# Patient Record
Sex: FEMALE | Race: WHITE | NOT HISPANIC OR LATINO | Employment: FULL TIME | URBAN - METROPOLITAN AREA
[De-identification: names, ages, dates, MRNs, and addresses within clinical notes are randomized per-mention and may not be internally consistent; named-entity substitution may affect disease eponyms.]

---

## 2018-07-06 ENCOUNTER — RECORDS - HEALTHEAST (OUTPATIENT)
Dept: LAB | Facility: CLINIC | Age: 23
End: 2018-07-06

## 2018-07-09 LAB
QTF INTERPRETATION: NORMAL
QTF MITOGEN - NIL: >10 IU/ML
QTF NIL: 0.12 IU/ML
QTF RESULT: NEGATIVE
QTF TB ANTIGEN - NIL: -0.04 IU/ML

## 2021-05-18 NOTE — PROGRESS NOTES
SUBJECTIVE:   CC: Ayala Vallejo is an 26 year old woman who presents for preventive health visit.       Patient has been advised of split billing requirements and indicates understanding: Yes  Healthy Habits:     Getting at least 3 servings of Calcium per day:  Yes    Bi-annual eye exam:  Yes    Dental care twice a year:  Yes    Sleep apnea or symptoms of sleep apnea:  None    Diet:  Regular (no restrictions)    Frequency of exercise:  2-3 days/week    Duration of exercise:  45-60 minutes    Taking medications regularly:  Yes    Medication side effects:  None    PHQ-2 Total Score: 0    Additional concerns today:  No      Works overnight  Has constipation   Concerned about hemorrhoid  Eats fiber and drinks water  started this work schedule this past October  Has tried miralrx and colace and suppository  Has bloating    Works 3 days in a row nights    PROBLEMS TO ADD ON...    Today's PHQ-2 Score: No flowsheet data found.    Abuse: Current or Past (Physical, Sexual or Emotional) - No  Do you feel safe in your environment? Yes    Have you ever done Advance Care Planning? (For example, a Health Directive, POLST, or a discussion with a medical provider or your loved ones about your wishes): No, advance care planning information given to patient to review.  Patient declined advance care planning discussion at this time.    Social History     Tobacco Use     Smoking status: Not on file   Substance Use Topics     Alcohol use: Not on file     If you drink alcohol do you typically have >3 drinks per day or >7 drinks per week? No    No flowsheet data found.No flowsheet data found.    Reviewed orders with patient.  Reviewed health maintenance and updated orders accordingly - Yes  Labs reviewed in EPIC    Breast Cancer Screening:  Any new diagnosis of family breast, ovarian, or bowel cancer? Yes       FSH-7: No flowsheet data found.    Patient under 40 years of age: Routine Mammogram Screening not recommended.   Pertinent  mammograms are reviewed under the imaging tab.    History of abnormal Pap smear: NO - age 30-65 PAP every 5 years with negative HPV co-testing recommended     Reviewed and updated as needed this visit by clinical staff                 Reviewed and updated as needed this visit by Provider                No past medical history on file.   No past surgical history on file.    Review of Systems  CONSTITUTIONAL: NEGATIVE for fever, chills, change in weight  INTEGUMENTARU/SKIN: NEGATIVE for worrisome rashes, moles or lesions  EYES: NEGATIVE for vision changes or irritation  ENT: NEGATIVE for ear, mouth and throat problems  RESP: NEGATIVE for significant cough or SOB  BREAST: NEGATIVE for masses, tenderness or discharge  CV: NEGATIVE for chest pain, palpitations or peripheral edema  GI: NEGATIVE for nausea, abdominal pain, heartburn, or change in bowel habits  : NEGATIVE for unusual urinary or vaginal symptoms. Periods are regular.  MUSCULOSKELETAL: NEGATIVE for significant arthralgias or myalgia  NEURO: NEGATIVE for weakness, dizziness or paresthesias  PSYCHIATRIC: NEGATIVE for changes in mood or affect     OBJECTIVE:   There were no vitals taken for this visit.  Physical Exam  GENERAL: healthy, alert and no distress  EYES: Eyes grossly normal to inspection, PERRL and conjunctivae and sclerae normal  HENT: ear canals and TM's normal, nose and mouth without ulcers or lesions  NECK: no adenopathy, no asymmetry, masses, or scars and thyroid normal to palpation  RESP: lungs clear to auscultation - no rales, rhonchi or wheezes  BREAST: normal without masses, tenderness or nipple discharge and no palpable axillary masses or adenopathy  CV: regular rate and rhythm, normal S1 S2, no S3 or S4, no murmur, click or rub, no peripheral edema and peripheral pulses strong  ABDOMEN: soft, nontender, no hepatosplenomegaly, no masses and bowel sounds normal   (female): normal female external genitalia, normal urethral meatus, vaginal  mucosa pink, moist, well rugated, and normal cervix/adnexa/uterus without masses or discharge  MS: no gross musculoskeletal defects noted, no edema  SKIN: no suspicious lesions or rashes  NEURO: Normal strength and tone, mentation intact and speech normal  PSYCH: mentation appears normal, affect normal/bright    Diagnostic Test Results:  Labs reviewed in Epic    ASSESSMENT/PLAN:   1. Routine general medical examination at a health care facility    - ESTARYLLA 0.25-35 MG-MCG tablet; Take 1 tablet by mouth daily  Dispense: 90 tablet; Refill: 3  - Pap imaged thin layer screen reflex to HPV if ASCUS - recommend age 25 - 29    Patient has been advised of split billing requirements and indicates understanding: Yes  COUNSELING:  Reviewed preventive health counseling, as reflected in patient instructions       Regular exercise       Healthy diet/nutrition    There is no height or weight on file to calculate BMI.        She has no history on file for tobacco.      Counseling Resources:  ATP IV Guidelines  Pooled Cohorts Equation Calculator  Breast Cancer Risk Calculator  BRCA-Related Cancer Risk Assessment: FHS-7 Tool  FRAX Risk Assessment  ICSI Preventive Guidelines  Dietary Guidelines for Americans, 2010  USDA's MyPlate  ASA Prophylaxis  Lung CA Screening    Josephine Leo MD  [facility]

## 2021-05-20 ENCOUNTER — OFFICE VISIT (OUTPATIENT)
Dept: FAMILY MEDICINE | Facility: CLINIC | Age: 26
End: 2021-05-20
Payer: COMMERCIAL

## 2021-05-20 VITALS
TEMPERATURE: 98.3 F | RESPIRATION RATE: 16 BRPM | HEART RATE: 64 BPM | HEIGHT: 66 IN | DIASTOLIC BLOOD PRESSURE: 73 MMHG | WEIGHT: 150 LBS | OXYGEN SATURATION: 98 % | SYSTOLIC BLOOD PRESSURE: 113 MMHG | BODY MASS INDEX: 24.11 KG/M2

## 2021-05-20 DIAGNOSIS — Z00.00 ROUTINE GENERAL MEDICAL EXAMINATION AT A HEALTH CARE FACILITY: Primary | ICD-10-CM

## 2021-05-20 PROCEDURE — G0145 SCR C/V CYTO,THINLAYER,RESCR: HCPCS | Performed by: FAMILY MEDICINE

## 2021-05-20 PROCEDURE — 99385 PREV VISIT NEW AGE 18-39: CPT | Performed by: FAMILY MEDICINE

## 2021-05-20 RX ORDER — NORGESTIMATE AND ETHINYL ESTRADIOL 0.25-0.035
1 KIT ORAL DAILY
Qty: 90 TABLET | Refills: 3 | Status: SHIPPED | OUTPATIENT
Start: 2021-05-20 | End: 2022-10-06

## 2021-05-20 RX ORDER — NORGESTIMATE AND ETHINYL ESTRADIOL 0.25-0.035
1 KIT ORAL DAILY
COMMUNITY
Start: 2021-04-01 | End: 2021-05-20

## 2021-05-20 SDOH — HEALTH STABILITY: MENTAL HEALTH: HOW MANY STANDARD DRINKS CONTAINING ALCOHOL DO YOU HAVE ON A TYPICAL DAY?: NOT ASKED

## 2021-05-20 SDOH — HEALTH STABILITY: MENTAL HEALTH: HOW OFTEN DO YOU HAVE A DRINK CONTAINING ALCOHOL?: NOT ASKED

## 2021-05-20 SDOH — HEALTH STABILITY: MENTAL HEALTH: HOW OFTEN DO YOU HAVE 6 OR MORE DRINKS ON ONE OCCASION?: NOT ASKED

## 2021-05-20 ASSESSMENT — MIFFLIN-ST. JEOR: SCORE: 1434.21

## 2021-05-25 LAB
COPATH REPORT: NORMAL
PAP: NORMAL

## 2021-06-21 ENCOUNTER — TRANSFERRED RECORDS (OUTPATIENT)
Dept: HEALTH INFORMATION MANAGEMENT | Facility: CLINIC | Age: 26
End: 2021-06-21

## 2021-11-18 ENCOUNTER — TRANSFERRED RECORDS (OUTPATIENT)
Dept: HEALTH INFORMATION MANAGEMENT | Facility: CLINIC | Age: 26
End: 2021-11-18
Payer: COMMERCIAL

## 2021-11-29 ENCOUNTER — TRANSFERRED RECORDS (OUTPATIENT)
Dept: HEALTH INFORMATION MANAGEMENT | Facility: CLINIC | Age: 26
End: 2021-11-29
Payer: COMMERCIAL

## 2022-01-17 ENCOUNTER — TRANSFERRED RECORDS (OUTPATIENT)
Dept: HEALTH INFORMATION MANAGEMENT | Facility: CLINIC | Age: 27
End: 2022-01-17
Payer: COMMERCIAL

## 2022-03-02 ENCOUNTER — TRANSFERRED RECORDS (OUTPATIENT)
Dept: HEALTH INFORMATION MANAGEMENT | Facility: CLINIC | Age: 27
End: 2022-03-02
Payer: COMMERCIAL

## 2022-04-04 ENCOUNTER — TRANSFERRED RECORDS (OUTPATIENT)
Dept: HEALTH INFORMATION MANAGEMENT | Facility: CLINIC | Age: 27
End: 2022-04-04
Payer: COMMERCIAL

## 2022-07-17 ENCOUNTER — HEALTH MAINTENANCE LETTER (OUTPATIENT)
Age: 27
End: 2022-07-17

## 2022-09-25 ENCOUNTER — HEALTH MAINTENANCE LETTER (OUTPATIENT)
Age: 27
End: 2022-09-25

## 2022-10-03 ASSESSMENT — ENCOUNTER SYMPTOMS
WEAKNESS: 0
NAUSEA: 0
PALPITATIONS: 0
EYE PAIN: 0
DIARRHEA: 0
HEMATOCHEZIA: 0
PARESTHESIAS: 0
HEMATURIA: 0
DIZZINESS: 0
FEVER: 0
FREQUENCY: 0
SORE THROAT: 0
MYALGIAS: 0
SHORTNESS OF BREATH: 0
BREAST MASS: 0
CHILLS: 0
ARTHRALGIAS: 0
HEADACHES: 0
DYSURIA: 0
HEARTBURN: 0
COUGH: 0
ABDOMINAL PAIN: 0
JOINT SWELLING: 0
NERVOUS/ANXIOUS: 0
CONSTIPATION: 0

## 2022-10-06 ENCOUNTER — OFFICE VISIT (OUTPATIENT)
Dept: FAMILY MEDICINE | Facility: CLINIC | Age: 27
End: 2022-10-06
Payer: COMMERCIAL

## 2022-10-06 VITALS
DIASTOLIC BLOOD PRESSURE: 70 MMHG | TEMPERATURE: 97.5 F | BODY MASS INDEX: 24.48 KG/M2 | OXYGEN SATURATION: 98 % | SYSTOLIC BLOOD PRESSURE: 109 MMHG | HEART RATE: 63 BPM | HEIGHT: 66 IN | WEIGHT: 152.3 LBS | RESPIRATION RATE: 16 BRPM

## 2022-10-06 DIAGNOSIS — Z00.00 ROUTINE GENERAL MEDICAL EXAMINATION AT A HEALTH CARE FACILITY: Primary | ICD-10-CM

## 2022-10-06 DIAGNOSIS — N92.6 IRREGULAR PERIODS: ICD-10-CM

## 2022-10-06 LAB — HBA1C MFR BLD: 4.8 % (ref 0–5.6)

## 2022-10-06 PROCEDURE — 83036 HEMOGLOBIN GLYCOSYLATED A1C: CPT | Performed by: FAMILY MEDICINE

## 2022-10-06 PROCEDURE — 83001 ASSAY OF GONADOTROPIN (FSH): CPT | Performed by: FAMILY MEDICINE

## 2022-10-06 PROCEDURE — 84443 ASSAY THYROID STIM HORMONE: CPT | Performed by: FAMILY MEDICINE

## 2022-10-06 PROCEDURE — 84403 ASSAY OF TOTAL TESTOSTERONE: CPT | Performed by: FAMILY MEDICINE

## 2022-10-06 PROCEDURE — 83002 ASSAY OF GONADOTROPIN (LH): CPT | Performed by: FAMILY MEDICINE

## 2022-10-06 PROCEDURE — 82670 ASSAY OF TOTAL ESTRADIOL: CPT | Performed by: FAMILY MEDICINE

## 2022-10-06 PROCEDURE — 99395 PREV VISIT EST AGE 18-39: CPT | Performed by: FAMILY MEDICINE

## 2022-10-06 PROCEDURE — 36415 COLL VENOUS BLD VENIPUNCTURE: CPT | Performed by: FAMILY MEDICINE

## 2022-10-06 PROCEDURE — 82627 DEHYDROEPIANDROSTERONE: CPT | Performed by: FAMILY MEDICINE

## 2022-10-06 ASSESSMENT — ENCOUNTER SYMPTOMS
EYE PAIN: 0
MYALGIAS: 0
CONSTIPATION: 0
PALPITATIONS: 0
DIARRHEA: 0
DYSURIA: 0
FEVER: 0
FREQUENCY: 0
CHILLS: 0
NAUSEA: 0
JOINT SWELLING: 0
HEADACHES: 0
WEAKNESS: 0
HEMATURIA: 0
ARTHRALGIAS: 0
SHORTNESS OF BREATH: 0
COUGH: 0
BREAST MASS: 0
DIZZINESS: 0
HEARTBURN: 0
HEMATOCHEZIA: 0
PARESTHESIAS: 0
SORE THROAT: 0
ABDOMINAL PAIN: 0
NERVOUS/ANXIOUS: 0

## 2022-10-06 ASSESSMENT — PAIN SCALES - GENERAL: PAINLEVEL: NO PAIN (0)

## 2022-10-06 NOTE — PROGRESS NOTES
SUBJECTIVE:   CC: Ayala is an 27 year old who presents for preventive health visit.     Patient has been advised of split billing requirements and indicates understanding: Yes  Healthy Habits:     Getting at least 3 servings of Calcium per day:  Yes    Bi-annual eye exam:  Yes    Dental care twice a year:  Yes    Sleep apnea or symptoms of sleep apnea:  None    Diet:  Regular (no restrictions)    Frequency of exercise:  2-3 days/week    Duration of exercise:  30-45 minutes    Taking medications regularly:  Not Applicable    PHQ-2 Total Score: 0    Additional concerns today:  No    (Z00.00) Routine general medical examination at a health care facility  (primary encounter diagnosis)  Comment:   Plan:     (N92.6) Irregular periods  Comment: Reports that she is always had some irregular periods has not had hormonal evaluation for this stopped birth control   Plan: Hemoglobin A1c, TSH with free T4 reflex,         Luteinizing Hormone, Adult, Follicle         stimulating hormone, Estradiol, DHEA sulfate,         Testosterone, total                      Today's PHQ-2 Score:   PHQ-2 ( 1999 [employer]) 10/3/2022   Q1: Little interest or pleasure in doing things 0   Q2: Feeling down, depressed or hopeless 0   PHQ-2 Score 0   PHQ-2 Total Score (12-17 Years)- Positive if 3 or more points; Administer PHQ-A if positive -   Q1: Little interest or pleasure in doing things Not at all   Q2: Feeling down, depressed or hopeless Not at all   PHQ-2 Score 0       Abuse: Current or Past (Physical, Sexual or Emotional) - No  Do you feel safe in your environment? Yes        Social History     Tobacco Use     Smoking status: Never Smoker     Smokeless tobacco: Never Used   Substance Use Topics     Alcohol use: Yes     If you drink alcohol do you typically have >3 drinks per day or >7 drinks per week? No    No flowsheet data found.    Reviewed orders with patient.  Reviewed health maintenance and updated orders accordingly - Yes  Lab work is in  process    Breast Cancer Screening:    FHS-7:   Breast CA Risk Assessment (FHS-7) 10/6/2022   Did any of your first-degree relatives have breast or ovarian cancer? No   Did any of your relatives have bilateral breast cancer? No   Did any man in your family have breast cancer? No   Did any woman in your family have breast and ovarian cancer? No   Did any woman in your family have breast cancer before age 50 y? No   Do you have 2 or more relatives with breast and/or ovarian cancer? No   Do you have 2 or more relatives with breast and/or bowel cancer? Yes       Patient under 40 years of age: Routine Mammogram Screening not recommended.   Pertinent mammograms are reviewed under the imaging tab.    History of abnormal Pap smear: NO - age 30- 65 PAP every 3 years recommended  PAP / HPV 5/20/2021   PAP (Historical) NIL     Reviewed and updated as needed this visit by clinical staff   Tobacco  Allergies  Meds   Med Hx  Surg Hx  Fam Hx  Soc Hx          Reviewed and updated as needed this visit by Provider                       Review of Systems   Constitutional: Negative for chills and fever.   HENT: Negative for congestion, ear pain, hearing loss and sore throat.    Eyes: Negative for pain and visual disturbance.   Respiratory: Negative for cough and shortness of breath.    Cardiovascular: Negative for chest pain, palpitations and peripheral edema.   Gastrointestinal: Negative for abdominal pain, constipation, diarrhea, heartburn, hematochezia and nausea.   Breasts:  Negative for tenderness, breast mass and discharge.   Genitourinary: Negative for dysuria, frequency, genital sores, hematuria, pelvic pain, urgency, vaginal bleeding and vaginal discharge.   Musculoskeletal: Negative for arthralgias, joint swelling and myalgias.   Skin: Negative for rash.   Neurological: Negative for dizziness, weakness, headaches and paresthesias.   Psychiatric/Behavioral: Negative for mood changes. The patient is not nervous/anxious.   "         OBJECTIVE:   /70   Pulse 63   Temp 97.5  F (36.4  C) (Temporal)   Resp 16   Ht 1.665 m (5' 5.55\")   Wt 69.1 kg (152 lb 4.8 oz)   LMP 09/28/2022 (Approximate)   SpO2 98%   BMI 24.92 kg/m    Physical Exam  GENERAL: healthy, alert and no distress  EYES: Eyes grossly normal to inspection, PERRL and conjunctivae and sclerae normal  HENT: ear canals and TM's normal, nose and mouth without ulcers or lesions  NECK: no adenopathy, no asymmetry, masses, or scars and thyroid normal to palpation  RESP: lungs clear to auscultation - no rales, rhonchi or wheezes  BREAST: normal without masses, tenderness or nipple discharge and no palpable axillary masses or adenopathy  CV: regular rate and rhythm, normal S1 S2, no S3 or S4, no murmur, click or rub, no peripheral edema and peripheral pulses strong  ABDOMEN: soft, nontender, no hepatosplenomegaly, no masses and bowel sounds normal  MS: no gross musculoskeletal defects noted, no edema  SKIN: no suspicious lesions or rashes  NEURO: Normal strength and tone, mentation intact and speech normal  PSYCH: mentation appears normal, affect normal/bright    Diagnostic Test Results:  Labs reviewed in Epic    ASSESSMENT/PLAN:   (Z00.00) Routine general medical examination at a health care facility  (primary encounter diagnosis)  Comment:    Plan:      (N92.6) Irregular periods  Comment:    Plan: Hemoglobin A1c, TSH with free T4 reflex,         Luteinizing Hormone, Adult, Follicle         stimulating hormone, Estradiol, DHEA sulfate,         Testosterone, total               Patient has been advised of split billing requirements and indicates understanding: Yes    COUNSELING:  Reviewed preventive health counseling, as reflected in patient instructions       Regular exercise       Healthy diet/nutrition       Contraception    Estimated body mass index is 24.92 kg/m  as calculated from the following:    Height as of this encounter: 1.665 m (5' 5.55\").    Weight as of this " encounter: 69.1 kg (152 lb 4.8 oz).        She reports that she has never smoked. She has never used smokeless tobacco.      Counseling Resources:  ATP IV Guidelines  Pooled Cohorts Equation Calculator  Breast Cancer Risk Calculator  BRCA-Related Cancer Risk Assessment: FHS-7 Tool  FRAX Risk Assessment  ICSI Preventive Guidelines  Dietary Guidelines for Americans, 2010  USDA's MyPlate  ASA Prophylaxis  Lung CA Screening    Josephine Leo MD  [facility]

## 2022-10-07 LAB
DHEA-S SERPL-MCNC: 103 UG/DL (ref 35–430)
ESTRADIOL SERPL-MCNC: 47 PG/ML
FSH SERPL IRP2-ACNC: 7.3 MIU/ML
LH SERPL-ACNC: 10.3 MIU/ML
TSH SERPL DL<=0.005 MIU/L-ACNC: 2.84 MU/L (ref 0.4–4)

## 2022-10-08 LAB — TESTOST SERPL-MCNC: 44 NG/DL (ref 8–60)

## 2022-10-18 NOTE — RESULT ENCOUNTER NOTE
Hello,    Great news, your results were normal.  Normal hormone levels and no signs of diabetes  If you continue to have irregular periods we can manage these usually with birth control of some form, but currently it does not look like you have signs of PCOS which is good.    Josephine Leo MD

## 2023-04-06 ENCOUNTER — OFFICE VISIT (OUTPATIENT)
Dept: OBGYN | Facility: CLINIC | Age: 28
End: 2023-04-06
Payer: COMMERCIAL

## 2023-04-06 VITALS
DIASTOLIC BLOOD PRESSURE: 62 MMHG | SYSTOLIC BLOOD PRESSURE: 110 MMHG | HEIGHT: 66 IN | BODY MASS INDEX: 25.55 KG/M2 | WEIGHT: 159 LBS

## 2023-04-06 DIAGNOSIS — Z13.228 SCREENING FOR METABOLIC DISORDER: ICD-10-CM

## 2023-04-06 DIAGNOSIS — Z13.29 SCREENING FOR THYROID DISORDER: ICD-10-CM

## 2023-04-06 DIAGNOSIS — Z01.419 ENCOUNTER FOR GYNECOLOGICAL EXAMINATION WITHOUT ABNORMAL FINDING: Primary | ICD-10-CM

## 2023-04-06 LAB
CORTIS SERPL-MCNC: 10.1 UG/DL
TSH SERPL DL<=0.005 MIU/L-ACNC: 1.12 UIU/ML (ref 0.3–4.2)

## 2023-04-06 PROCEDURE — 99385 PREV VISIT NEW AGE 18-39: CPT | Performed by: NURSE PRACTITIONER

## 2023-04-06 PROCEDURE — 84443 ASSAY THYROID STIM HORMONE: CPT | Performed by: NURSE PRACTITIONER

## 2023-04-06 PROCEDURE — 82533 TOTAL CORTISOL: CPT | Performed by: NURSE PRACTITIONER

## 2023-04-06 PROCEDURE — 36415 COLL VENOUS BLD VENIPUNCTURE: CPT | Performed by: NURSE PRACTITIONER

## 2023-04-06 ASSESSMENT — ANXIETY QUESTIONNAIRES
2. NOT BEING ABLE TO STOP OR CONTROL WORRYING: NOT AT ALL
GAD7 TOTAL SCORE: 0
6. BECOMING EASILY ANNOYED OR IRRITABLE: NOT AT ALL
7. FEELING AFRAID AS IF SOMETHING AWFUL MIGHT HAPPEN: NOT AT ALL
1. FEELING NERVOUS, ANXIOUS, OR ON EDGE: NOT AT ALL
GAD7 TOTAL SCORE: 0
3. WORRYING TOO MUCH ABOUT DIFFERENT THINGS: NOT AT ALL
5. BEING SO RESTLESS THAT IT IS HARD TO SIT STILL: NOT AT ALL

## 2023-04-06 ASSESSMENT — PATIENT HEALTH QUESTIONNAIRE - PHQ9
SUM OF ALL RESPONSES TO PHQ QUESTIONS 1-9: 0
5. POOR APPETITE OR OVEREATING: NOT AT ALL

## 2023-04-06 NOTE — PROGRESS NOTES
Radha is a 28 year old No obstetric history on file. female who presents for annual exam.     Besides routine health maintenance, she would like to discuss irregular cycles.    HPI:  The patient's PCP is Dr. Josephine Leo MD.  Ayala presents for an annual exam. She went off of COCs in March of last year and did not get a period until a few months ago. Since that time, she has had two cycles, approx 1.5 months apart. Labs were completed in October and were normal. Additionally, she has noticed a 20 lb weight gain over the last year. She has not changed her diet or exercise habits at all. She does notice that she feels cold frequently, but no other associated symptoms. She is sexually active and not currently using contraception. She and her  are open to pregnancy at this time, though not actively trying. She denies abnormal discharge or any other symptoms today. She works as a nurse at [employer].      GYNECOLOGIC HISTORY:    Patient's last menstrual period was 2023 (approximate).    Regular menses? no  Menses every 6 months.  Length of menses: 4 days    Her current contraception method is: none.  She  reports that she has never smoked. She has never used smokeless tobacco.    Patient is sexually active.  STD testing offered?  Declined    Last GAD7 score on record =       2023     1:12 PM   JANET-7 SCORE   Total Score 0     Alcohol Score = 3    HEALTH MAINTENANCE:  Cholesterol: No results  Last Mammo: Not applicable, Next Mammo: Due at age 41   Pap:   Lab Results   Component Value Date    PAP NIL 2021      Colonoscopy: N/A, Next Colonoscopy: Due at age 46.  Dexa:  N/A    Health maintenance updated:  yes    HISTORY:  OB History    Para Term  AB Living   0 0 0 0 0 0   SAB IAB Ectopic Multiple Live Births   0 0 0 0 0       There is no problem list on file for this patient.    Past Surgical History:   Procedure Laterality Date     ORTHOPEDIC SURGERY  2021     "  Social History     Tobacco Use     Smoking status: Never     Smokeless tobacco: Never   Vaping Use     Vaping status: Not on file   Substance Use Topics     Alcohol use: Yes      Problem (# of Occurrences) Relation (Name,Age of Onset)    Hypertension (1) Mother (Rosa Vallejo)       Negative family history of: Breast Cancer, Colon Cancer            No current outpatient medications on file.     No current facility-administered medications for this visit.     No Known Allergies    Past medical, surgical, social and family histories were reviewed and updated in EPIC.    ROS:   12 point review of systems negative other than symptoms noted below or in the HPI.  Genitourinary: Irregular Menses  No urinary frequency or dysuria, bladder or kidney problems    EXAM:  /62   Ht 1.664 m (5' 5.5\")   Wt 72.1 kg (159 lb)   LMP 03/16/2023 (Approximate)   BMI 26.06 kg/m     BMI: Body mass index is 26.06 kg/m .    PHYSICAL EXAM:  Constitutional:   Appearance: Well nourished, well developed, alert, in no acute distress  Neck:  Lymph Nodes:  No lymphadenopathy present    Thyroid:  Gland size normal, nontender, no nodules or masses present  on palpation  Chest:  Respiratory Effort:  Breathing unlabored  Cardiovascular:    Heart: Auscultation:  Regular rate, normal rhythm, no murmurs present  Breasts: Inspection of Breasts:  No lymphadenopathy present., Palpation of Breasts and Axillae:  No masses present on palpation, no breast tenderness., Axillary Lymph Nodes:  No lymphadenopathy present. and No nodularity, asymmetry or nipple discharge bilaterally.  Gastrointestinal:   Abdominal Examination:  Abdomen nontender to palpation, tone normal without rigidity or guarding, no masses present, umbilicus without lesions   Liver and Spleen:  No hepatomegaly present, liver nontender to palpation    Hernias:  No hernias present  Lymphatic: Lymph Nodes:  No other lymphadenopathy present  Skin:  General Inspection:  No rashes present, no " lesions present, no areas of  discoloration  Neurologic:    Mental Status:  Oriented X3.  Normal strength and tone, sensory exam                grossly normal, mentation intact and speech normal.    Psychiatric:   Mentation appears normal and affect normal/bright.         Pelvic Exam:  External Genitalia:     Normal appearance for age, no discharge present, no tenderness present, no inflammatory lesions present, color normal  Vagina:     Normal vaginal vault without central or paravaginal defects, no discharge present, no inflammatory lesions present, no masses present  Bladder:     Nontender to palpation  Urethra:   Urethral Body:  Urethra palpation normal, urethra structural support normal   Urethral Meatus:  No erythema or lesions present  Cervix:     Appearance healthy, no lesions present, nontender to palpation, no bleeding present  Uterus:     Uterus: firm, normal sized and nontender, anteverted in position.   Adnexa:     No adnexal tenderness present, no adnexal masses present  Perineum:     Perineum within normal limits, no evidence of trauma, no rashes or skin lesions present  Anus:     Anus within normal limits, no hemorrhoids present  Inguinal Lymph Nodes:     No lymphadenopathy present  Pubic Hair:     Normal pubic hair distribution for age  Genitalia and Groin:     No rashes present, no lesions present, no areas of discoloration, no masses present      COUNSELING:   Reviewed preventive health counseling, as reflected in patient instructions    BMI: Body mass index is 26.06 kg/m .  Weight management plan: Discussed healthy diet and exercise guidelines    ASSESSMENT:  28 year old female with satisfactory annual exam.    ICD-10-CM    1. Encounter for gynecological examination without abnormal finding  Z01.419       2. Screening for thyroid disorder  Z13.29 TSH with free T4 reflex     TSH with free T4 reflex      3. Screening for metabolic disorder  Z13.228 Cortisol     Cortisol          PLAN:  TSH and  Cortisol for assessment of weight gain.  Discussed that her recent cycles are reassuring of return to more regular menstrual cycles. Advised that she continue to monitor the next few cycles; if they normalize, she does not need any intervention. If she notices more variability in cycle length, she may reach out for evaluation.  Follow up as needed or in one year for annual exam.    I, Desire Tan, completed the PFSH and ROS. I then acted as a scribe for Kayla PEREZ CNP for the remainder of the visit.  Desire Tan BSN, RN  [facility] DNP, WHNP student    I was present with the student who participated in the service and in the documentation of the note. I have verified the history and personally performed the physical exam and medical decision-making. I agree with the assessment and plan of care as documented in the note.      ANA Kitchen CNP

## 2024-05-02 ENCOUNTER — TRANSFERRED RECORDS (OUTPATIENT)
Dept: HEALTH INFORMATION MANAGEMENT | Facility: CLINIC | Age: 29
End: 2024-05-02
Payer: COMMERCIAL

## 2024-05-08 NOTE — RESULT ENCOUNTER NOTE
Hello,    Colonoscopy showed normal colon but did show some internal hemorrhoids.  Let me know if you need follow-up on this or if you already have arranged for this.    Josephine Leo MD

## 2024-05-11 ENCOUNTER — HEALTH MAINTENANCE LETTER (OUTPATIENT)
Age: 29
End: 2024-05-11

## 2024-06-03 ENCOUNTER — VIRTUAL VISIT (OUTPATIENT)
Dept: FAMILY MEDICINE | Facility: CLINIC | Age: 29
End: 2024-06-03
Payer: COMMERCIAL

## 2024-06-03 DIAGNOSIS — L50.9 HIVES: Primary | ICD-10-CM

## 2024-06-03 DIAGNOSIS — K59.00 CONSTIPATION, UNSPECIFIED CONSTIPATION TYPE: ICD-10-CM

## 2024-06-03 PROCEDURE — 99214 OFFICE O/P EST MOD 30 MIN: CPT | Mod: 95 | Performed by: FAMILY MEDICINE

## 2024-06-03 RX ORDER — EPINEPHRINE 0.3 MG/.3ML
0.3 INJECTION SUBCUTANEOUS PRN
Qty: 2 EACH | Refills: 4 | Status: SHIPPED | OUTPATIENT
Start: 2024-06-03

## 2024-06-03 RX ORDER — ONDANSETRON 4 MG/1
4 TABLET, ORALLY DISINTEGRATING ORAL EVERY 8 HOURS PRN
Qty: 40 TABLET | Refills: 2 | Status: SHIPPED | OUTPATIENT
Start: 2024-06-03

## 2024-06-03 NOTE — PROGRESS NOTES
Ayala is a 29 year old who is being evaluated via a billable video visit.          Assessment & Plan     (L50.9) Hives  (primary encounter diagnosis)  Comment: offered allergy testing - not getting in to allergist until late summer  Meds for rescue too  Reveiwed sometimes hives can indicated bacterial infection - advised to watch for any infections  Plan: EPINEPHrine (ANY BX GENERIC EQUIV) 0.3 MG/0.3ML        injection 2-pack, Semmes Resp Allergen Panel,         Allergy adult food panel, UA with Microscopic         reflex to Culture - lab collect, CBC with         platelets and differential, ESR: Erythrocyte         sedimentation rate, CRP, inflammation, Anti         Nuclear Zo IgG by IFA with Reflex, DNA double         stranded antibodies, TSH with free T4 reflex,         ondansetron (ZOFRAN ODT) 4 MG ODT tab            (K59.00) Constipation, unspecified constipation type  Comment: will try to send in linzess  Plan: TSH with free T4 reflex, ondansetron (ZOFRAN         ODT) 4 MG ODT tab, DISCONTINUED: linaclotide         (LINZESS) 290 MCG capsule                        Subjective   Ayala is a 29 year old, presenting for the following health issues:  No chief complaint on file.    HPI   Allergies:  Getting bad seasonal allergies  Has tried benadryl and zyrtec hydroxyzine  Don't seem to work fast enough  Has been using both meds together  Has not tried nasal spray and eye drops    Has more skin reaction  Lips get itchy throat feels narrow  Has allergy appointment  In August  Plans to go to allergist in [place]  Has been tracking her diet  The last 6 times this happened ahs been a list of what she has eaten no correlations noted yet  Has noted more of these events when at work  Can happen at home too    Is not on any meds    Has head to toe swelling    Started taking notes back in January  Has had hives  Back arms and legs  Face    Stools:  Difficulty with constipation  Has tried dietary changes water fiber miralax  Had  a trial of linzess - this worked well for her  concern this is not covered by insurance now              Review of Systems  Constitutional, HEENT, cardiovascular, pulmonary, gi and gu systems are negative, except as otherwise noted.      Objective           Vitals:  No vitals were obtained today due to virtual visit.    Physical Exam   GENERAL: alert and no distress  EYES: Eyes grossly normal to inspection.  No discharge or erythema, or obvious scleral/conjunctival abnormalities.  RESP: No audible wheeze, cough, or visible cyanosis.    SKIN: Visible skin clear. No significant rash, abnormal pigmentation or lesions.  NEURO: Cranial nerves grossly intact.  Mentation and speech appropriate for age.  PSYCH: Appropriate affect, tone, and pace of words          Video-Visit Details  Joined the call at 6/3/2024, 5:23:33 pm.  Left the call at 6/3/2024, 5:36:34 pm.  You were on the call for 13 minutes .  Type of service:  Video Visit   Originating Location (pt. Location): Home    Distant Location (provider location):  On-site  Platform used for Video Visit: Martín  Signed Electronically by: Josephine Leo MD

## 2024-06-05 ENCOUNTER — TELEPHONE (OUTPATIENT)
Dept: FAMILY MEDICINE | Facility: CLINIC | Age: 29
End: 2024-06-05
Payer: COMMERCIAL

## 2024-06-05 DIAGNOSIS — K59.00 CONSTIPATION, UNSPECIFIED CONSTIPATION TYPE: Primary | ICD-10-CM

## 2024-06-05 NOTE — TELEPHONE ENCOUNTER
PRIOR AUTHORIZATION DENIED    Medication: LINACLOTIDE 290 MCG PO CAPS  Insurance Company: [employer] [place] - Phone [phone] Fax [phone]  Denial Date: 6/5/2024  Denial Reason(s):       Appeal Information:     Patient Notified: No

## 2024-06-07 NOTE — TELEPHONE ENCOUNTER
SN,    Please see below: medication was denied for patient.    If you would like to start appeal, please write Letter of Medical Necessity ID number [id number] under the letter tab and send back to [facility] MED POOL (P [id number])    Thank you,    Jordyn Wolf RN

## 2024-06-10 ENCOUNTER — MYC MEDICAL ADVICE (OUTPATIENT)
Dept: FAMILY MEDICINE | Facility: CLINIC | Age: 29
End: 2024-06-10
Payer: COMMERCIAL

## 2024-06-10 DIAGNOSIS — K59.00 CONSTIPATION, UNSPECIFIED CONSTIPATION TYPE: ICD-10-CM

## 2024-06-10 NOTE — TELEPHONE ENCOUNTER
Please let pt know that insurance denied this med but will cover trulance/plecanide.  If she has already tried this med then please ask what her side effects were or if the medications did not work and then we can let them know this nd run the PA.  Ifgary has not tried this then would need to try it for 4 weeks    I sent this out to her pharmacy    Thank you    SN

## 2024-06-10 NOTE — TELEPHONE ENCOUNTER
JOOR message sent to patient.    Your insurance denied prior authorization for Linaclotide capsules but will cover trulance/plecaide.        Have you tried trulance/plecaide before?       Jordyn Wolf RN

## 2024-06-11 NOTE — TELEPHONE ENCOUNTER
SN,      Patient sent 2nd [employer] message:    Ayala Justice  You3 minutes ago (1:31 PM)     KG  Oops I m sorry, no I have not tried it. I got it confused with another medication       Jordyn Wolf RN

## 2024-06-11 NOTE — TELEPHONE ENCOUNTER
SN,      See message below from [employer] message sent to patient    Patient response:     Hi yes I have and it was not effective and I have adverse side effects       I have been taking linzess for a month now with no issues     Thanks,  Jordyn Wolf RN

## 2024-06-13 NOTE — TELEPHONE ENCOUNTER
Please let pt know that her insurance would like her to try Trulance/Plecanatide and if this is not working then we can re-try the Linzess    Thank you    SN

## 2024-06-14 ENCOUNTER — LAB (OUTPATIENT)
Dept: LAB | Facility: CLINIC | Age: 29
End: 2024-06-14
Payer: COMMERCIAL

## 2024-06-14 DIAGNOSIS — K59.00 CONSTIPATION, UNSPECIFIED CONSTIPATION TYPE: ICD-10-CM

## 2024-06-14 DIAGNOSIS — Z11.4 SCREENING FOR HIV (HUMAN IMMUNODEFICIENCY VIRUS): Primary | ICD-10-CM

## 2024-06-14 DIAGNOSIS — L50.9 HIVES: ICD-10-CM

## 2024-06-14 DIAGNOSIS — Z11.59 NEED FOR HEPATITIS C SCREENING TEST: ICD-10-CM

## 2024-06-14 LAB
ALBUMIN UR-MCNC: NEGATIVE MG/DL
APPEARANCE UR: CLEAR
BACTERIA #/AREA URNS HPF: ABNORMAL /HPF
BASOPHILS # BLD AUTO: 0 10E3/UL (ref 0–0.2)
BASOPHILS NFR BLD AUTO: 0 %
BILIRUB UR QL STRIP: NEGATIVE
COLOR UR AUTO: YELLOW
EOSINOPHIL # BLD AUTO: 0.1 10E3/UL (ref 0–0.7)
EOSINOPHIL NFR BLD AUTO: 2 %
ERYTHROCYTE [DISTWIDTH] IN BLOOD BY AUTOMATED COUNT: 11.2 % (ref 10–15)
ERYTHROCYTE [SEDIMENTATION RATE] IN BLOOD BY WESTERGREN METHOD: 6 MM/HR (ref 0–20)
GLUCOSE UR STRIP-MCNC: NEGATIVE MG/DL
HCT VFR BLD AUTO: 35.3 % (ref 35–47)
HGB BLD-MCNC: 12 G/DL (ref 11.7–15.7)
HGB UR QL STRIP: ABNORMAL
IMM GRANULOCYTES # BLD: 0 10E3/UL
IMM GRANULOCYTES NFR BLD: 0 %
KETONES UR STRIP-MCNC: NEGATIVE MG/DL
LEUKOCYTE ESTERASE UR QL STRIP: NEGATIVE
LYMPHOCYTES # BLD AUTO: 2.4 10E3/UL (ref 0.8–5.3)
LYMPHOCYTES NFR BLD AUTO: 35 %
MCH RBC QN AUTO: 31.1 PG (ref 26.5–33)
MCHC RBC AUTO-ENTMCNC: 34 G/DL (ref 31.5–36.5)
MCV RBC AUTO: 92 FL (ref 78–100)
MONOCYTES # BLD AUTO: 0.3 10E3/UL (ref 0–1.3)
MONOCYTES NFR BLD AUTO: 4 %
NEUTROPHILS # BLD AUTO: 3.9 10E3/UL (ref 1.6–8.3)
NEUTROPHILS NFR BLD AUTO: 58 %
NITRATE UR QL: NEGATIVE
PH UR STRIP: 7 [PH] (ref 5–7)
PLATELET # BLD AUTO: 128 10E3/UL (ref 150–450)
RBC # BLD AUTO: 3.86 10E6/UL (ref 3.8–5.2)
RBC #/AREA URNS AUTO: ABNORMAL /HPF
SP GR UR STRIP: 1.01 (ref 1–1.03)
SQUAMOUS #/AREA URNS AUTO: ABNORMAL /LPF
UROBILINOGEN UR STRIP-ACNC: 0.2 E.U./DL
WBC # BLD AUTO: 6.8 10E3/UL (ref 4–11)
WBC #/AREA URNS AUTO: ABNORMAL /HPF

## 2024-06-14 PROCEDURE — 86225 DNA ANTIBODY NATIVE: CPT

## 2024-06-14 PROCEDURE — 81001 URINALYSIS AUTO W/SCOPE: CPT

## 2024-06-14 PROCEDURE — 86140 C-REACTIVE PROTEIN: CPT

## 2024-06-14 PROCEDURE — 36415 COLL VENOUS BLD VENIPUNCTURE: CPT

## 2024-06-14 PROCEDURE — 86003 ALLG SPEC IGE CRUDE XTRC EA: CPT | Mod: 59

## 2024-06-14 PROCEDURE — 84443 ASSAY THYROID STIM HORMONE: CPT

## 2024-06-14 PROCEDURE — 85652 RBC SED RATE AUTOMATED: CPT

## 2024-06-14 PROCEDURE — 86038 ANTINUCLEAR ANTIBODIES: CPT

## 2024-06-14 PROCEDURE — 85025 COMPLETE CBC W/AUTO DIFF WBC: CPT

## 2024-06-14 PROCEDURE — 82785 ASSAY OF IGE: CPT

## 2024-06-14 PROCEDURE — 86803 HEPATITIS C AB TEST: CPT

## 2024-06-14 PROCEDURE — 87389 HIV-1 AG W/HIV-1&-2 AB AG IA: CPT

## 2024-06-15 DIAGNOSIS — D69.6 LOW PLATELET COUNT (H): Primary | ICD-10-CM

## 2024-06-15 LAB
CRP SERPL-MCNC: <3 MG/L
DSDNA AB SER-ACNC: 1.2 IU/ML
HCV AB SERPL QL IA: NONREACTIVE
HIV 1+2 AB+HIV1 P24 AG SERPL QL IA: NONREACTIVE
TSH SERPL DL<=0.005 MIU/L-ACNC: 1.05 UIU/ML (ref 0.3–4.2)

## 2024-06-15 NOTE — RESULT ENCOUNTER NOTE
Hello,    No UTI noted.  There was a trace amount of blood in the urine and this is non-specific.  Can be due to wiping with cleansing towel or periods.  We can repeat this if you have any urinary issues.otherwise it it now necessary.    Normal CBC - no signs of infection    The platelets were a little low.  This can be transient.  Let's recheck this lab in a few weeks to follow for any trend.    No inflammation noted and normal thyroid levels.        Josephine Leo MD

## 2024-06-17 LAB — ANA SER QL IF: NEGATIVE

## 2024-06-18 LAB
A ALTERNATA IGE QN: <0.1 KU(A)/L
A FUMIGATUS IGE QN: <0.1 KU(A)/L
ALMOND IGE QN: <0.1 KU(A)/L
BERMUDA GRASS IGE QN: <0.1 KU(A)/L
C HERBARUM IGE QN: <0.1 KU(A)/L
CASHEW NUT IGE QN: <0.1 KU(A)/L
CAT DANDER IGG QN: 4.35 KU(A)/L
CEDAR IGE QN: <0.1 KU(A)/L
CODFISH IGE QN: <0.1 KU(A)/L
COMMON RAGWEED IGE QN: <0.1 KU(A)/L
COTTONWOOD IGE QN: <0.1 KU(A)/L
COW MILK IGE QN: <0.1 KU(A)/L
D FARINAE IGE QN: 16.6 KU(A)/L
D PTERONYSS IGE QN: 14.1 KU(A)/L
DOG DANDER+EPITH IGE QN: 0.26 KU(A)/L
EGG WHITE IGE QN: <0.1 KU(A)/L
HAZELNUT IGE QN: <0.1 KU(A)/L
IGE SERPL-ACNC: 445 KU/L (ref 0–114)
IGE SERPL-ACNC: 471 KU/L (ref 0–114)
MAPLE IGE QN: <0.1 KU(A)/L
MARSH ELDER IGE QN: <0.1 KU(A)/L
MOUSE URINE PROT IGE QN: <0.1 KU(A)/L
NETTLE IGE QN: <0.1 KU(A)/L
P NOTATUM IGE QN: <0.1 KU(A)/L
PEANUT IGE QN: <0.1 KU(A)/L
ROACH IGE QN: 0.13 KU(A)/L
SALMON IGE QN: <0.1 KU(A)/L
SALTWORT IGE QN: <0.1 KU(A)/L
SCALLOP IGE QN: <0.1 KU(A)/L
SESAME SEED IGE QN: <0.1 KU(A)/L
SHRIMP IGE QN: <0.1 KU(A)/L
SILVER BIRCH IGE QN: <0.1 KU(A)/L
SOYBEAN IGE QN: <0.1 KU(A)/L
TIMOTHY IGE QN: <0.1 KU(A)/L
TUNA IGE QN: <0.1 KU(A)/L
WALNUT IGE QN: <0.1 KU(A)/L
WHEAT IGE QN: <0.1 KU(A)/L
WHITE ASH IGE QN: <0.1 KU(A)/L
WHITE ELM IGE QN: <0.1 KU(A)/L
WHITE MULBERRY IGE QN: <0.1 KU(A)/L
WHITE OAK IGE QN: <0.1 KU(A)/L

## 2024-06-18 NOTE — TELEPHONE ENCOUNTER
"I will try to resend previously recommended prescription per insurance to catch back up on this thread.  Thanks  Slime \"Daniel\" MARIA EUGENIA Terrazas   "

## 2024-07-24 NOTE — PROGRESS NOTES
Radha is a 29 year old  female who presents for annual exam.     Besides routine health maintenance,  she would like to discuss irregular cycles .    HPI:  The patient's PCP is Dr. Josephine Leo MD.   Here today for annual exam. Due for a pap smear. Has been tracking her cycles and notes that they last anytime from 48-56 days. She has been off birth control for a year now and has not been preventing pregnancy and would like to become pregnant. Does not have any pain or spotting with periods. Notes periods last 4 days and are pretty light with bright red blood.     GYNECOLOGIC HISTORY:    Patient's last menstrual period was 2024 (exact date).    Regular menses? no  Menses every 48-56 days.  Length of menses: 4 days    Her current contraception method is: none.  She  reports that she has never smoked. She has never used smokeless tobacco.    Patient is sexually active.  STD testing offered?  Declined  Last PHQ-9 score on record =       2023     1:12 PM   PHQ-9 SCORE   PHQ-9 Total Score 0     Last GAD7 score on record =       2023     1:12 PM   JANET-7 SCORE   Total Score 0     Alcohol Score =     HEALTH MAINTENANCE:  Cholesterol:  NA  Last Mammo: Not applicable, Result: Not applicable, Next Mammo: Due at age 41     Pap:   Lab Results   Component Value Date    PAP NIL 2021     Colonoscopy:  25, Result: internal hemorrhoids, Next Colonoscopy: 46 years.  Dexa:  NA    Health maintenance updated:  Yes    HISTORY:  OB History    Para Term  AB Living   0 0 0 0 0 0   SAB IAB Ectopic Multiple Live Births   0 0 0 0 0       Patient Active Problem List   Diagnosis    Attention deficit hyperactivity disorder, inattentive type    Concussion with no loss of consciousness    Dysmenorrhea    Labral tear of shoulder    Shoulder instability     Past Surgical History:   Procedure Laterality Date    ORTHOPEDIC SURGERY  2021      Social History     Tobacco Use    Smoking status:  "Never    Smokeless tobacco: Never   Substance Use Topics    Alcohol use: Yes      Problem (# of Occurrences) Relation (Name,Age of Onset)    Hypertension (1) Mother (Rosa Vallejo)    Rheumatoid Arthritis (1) Mother (Rosa Vallejo)           Negative family history of: Breast Cancer, Colon Cancer              Current Outpatient Medications   Medication Sig Dispense Refill    EPINEPHrine (ANY BX GENERIC EQUIV) 0.3 MG/0.3ML injection 2-pack Inject 0.3 mLs (0.3 mg) into the muscle as needed for anaphylaxis May repeat one time in 5-15 minutes if response to initial dose is inadequate. 2 each 4    ondansetron (ZOFRAN ODT) 4 MG ODT tab Take 1 tablet (4 mg) by mouth every 8 hours as needed for nausea 40 tablet 2     No current facility-administered medications for this visit.     No Known Allergies    Past medical, surgical, social and family histories were reviewed and updated in EPIC.    EXAM:  BP 90/74   Ht 1.657 m (5' 5.25\")   Wt 70.5 kg (155 lb 6.4 oz)   LMP 06/30/2024 (Exact Date)   Breastfeeding No   BMI 25.66 kg/m     BMI: Body mass index is 25.66 kg/m .    PHYSICAL EXAM:  Constitutional:   Appearance: Well nourished, well developed, alert, in no acute distress  Breasts: Inspection of Breasts:  No lymphadenopathy present., Palpation of Breasts and Axillae:  No masses present on palpation, no breast tenderness., Axillary Lymph Nodes:  No lymphadenopathy present., and No nodularity, asymmetry or nipple discharge bilaterally.  Lymphatic: Lymph Nodes:  No other lymphadenopathy present  Skin:  General Inspection:  No rashes present, no lesions present, no areas of  discoloration  Neurologic:    Mental Status:  Oriented X3.  Normal strength and tone, sensory exam                grossly normal, mentation intact and speech normal.    Psychiatric:   Mentation appears normal and affect normal/bright.         Pelvic Exam:  External Genitalia:     Normal appearance for age, no discharge present, no tenderness present, no " inflammatory lesions present, color normal  Vagina:     Normal vaginal vault without central or paravaginal defects, no discharge present, no inflammatory lesions present, no masses present  Bladder:     Nontender to palpation  Urethra:   Urethral Body:  Urethra palpation normal, urethra structural support normal   Urethral Meatus:  No erythema or lesions present  Cervix:     Appearance healthy, no lesions present, nontender to palpation, no bleeding present  Uterus:     Uterus: firm, normal sized and nontender, anteverted in position.   Adnexa:     No adnexal tenderness present, no adnexal masses present  Perineum:     Perineum within normal limits, no evidence of trauma, no rashes or skin lesions present  Anus:     Anus within normal limits, no hemorrhoids present  Inguinal Lymph Nodes:     No lymphadenopathy present  Pubic Hair:     Normal pubic hair distribution for age  Genitalia and Groin:     No rashes present, no lesions present, no areas of discoloration, no masses present    COUNSELING:   Reviewed preventive health counseling, as reflected in patient instructions       Family planning    BMI: Body mass index is 25.66 kg/m .      ASSESSMENT:  29 year old female with satisfactory annual exam.    ICD-10-CM    1. Encounter for gynecological examination without abnormal finding  Z01.419 MO PELVIC EXAMINATION      2. Irregular menses  N92.6 Estradiol     Follicle stimulating hormone     Mullerian Hormone Antibody     Progesterone     Prolactin     Sex Hormone Binding Globulin     Testosterone Free and Total     TSH with free T4 reflex     CBC with platelets     US Transvaginal Pelvic Non-OB      3. Screening for cervical cancer  Z12.4 Pap Screen Reflex to HPV if ASCUS - Recommended Age 25 - 29 Years          PLAN:  Here today for annual exam and pap smear. Discussed fertility tracking through cervical mucus. Labs ordered to be drawn on day 3 of cycle preferably. US order placed for her to schedule when able.  Will follow up with results and discuss Ovulation tracking kits if needed. Pap updated. If NIL repeat in 3 years.     LEANDRA Villalobos    I was present with the student who participated in the service and in the documentation of the note. I have verified the history and personally performed the physical exam and medical decision-making. I agree with the assessment and plan of care as documented in the note.    ANA Kitchen CNP

## 2024-07-25 ENCOUNTER — OFFICE VISIT (OUTPATIENT)
Dept: OBGYN | Facility: CLINIC | Age: 29
End: 2024-07-25
Payer: COMMERCIAL

## 2024-07-25 VITALS
WEIGHT: 155.4 LBS | DIASTOLIC BLOOD PRESSURE: 74 MMHG | SYSTOLIC BLOOD PRESSURE: 90 MMHG | HEIGHT: 65 IN | BODY MASS INDEX: 25.89 KG/M2

## 2024-07-25 DIAGNOSIS — N92.6 IRREGULAR MENSES: ICD-10-CM

## 2024-07-25 DIAGNOSIS — Z01.419 ENCOUNTER FOR GYNECOLOGICAL EXAMINATION WITHOUT ABNORMAL FINDING: Primary | ICD-10-CM

## 2024-07-25 DIAGNOSIS — Z12.4 SCREENING FOR CERVICAL CANCER: ICD-10-CM

## 2024-07-25 PROCEDURE — 99395 PREV VISIT EST AGE 18-39: CPT | Performed by: NURSE PRACTITIONER

## 2024-07-25 PROCEDURE — G0145 SCR C/V CYTO,THINLAYER,RESCR: HCPCS | Performed by: NURSE PRACTITIONER

## 2024-07-25 PROCEDURE — 99213 OFFICE O/P EST LOW 20 MIN: CPT | Mod: 25 | Performed by: NURSE PRACTITIONER

## 2024-07-25 PROCEDURE — 99459 PELVIC EXAMINATION: CPT | Performed by: NURSE PRACTITIONER

## 2024-07-31 LAB
BKR LAB AP GYN ADEQUACY: NORMAL
BKR LAB AP GYN INTERPRETATION: NORMAL
BKR LAB AP HPV REFLEX: NORMAL
BKR LAB AP PREVIOUS ABNORMAL: NORMAL
PATH REPORT.COMMENTS IMP SPEC: NORMAL
PATH REPORT.COMMENTS IMP SPEC: NORMAL
PATH REPORT.RELEVANT HX SPEC: NORMAL

## 2024-08-09 ENCOUNTER — ANCILLARY PROCEDURE (OUTPATIENT)
Dept: ULTRASOUND IMAGING | Facility: CLINIC | Age: 29
End: 2024-08-09
Attending: NURSE PRACTITIONER
Payer: COMMERCIAL

## 2024-08-09 DIAGNOSIS — N92.6 IRREGULAR MENSES: ICD-10-CM

## 2024-08-09 PROCEDURE — 76830 TRANSVAGINAL US NON-OB: CPT | Performed by: OBSTETRICS & GYNECOLOGY

## 2024-08-12 ENCOUNTER — VIRTUAL VISIT (OUTPATIENT)
Dept: OBGYN | Facility: CLINIC | Age: 29
End: 2024-08-12
Attending: NURSE PRACTITIONER
Payer: COMMERCIAL

## 2024-08-12 DIAGNOSIS — N92.6 IRREGULAR MENSES: Primary | ICD-10-CM

## 2024-08-12 PROCEDURE — 99442 PR PHYSICIAN TELEPHONE EVALUATION 11-20 MIN: CPT | Mod: 93 | Performed by: NURSE PRACTITIONER

## 2024-08-12 NOTE — PROGRESS NOTES
Radha Justice is a 29 year old female who is being evaluated via a patient initiated billable telephone visit.     What phone number would you like to be contacted at? [phone]  How would you like to obtain your AVS? Zayda      Originating Location (pt. Location): work      Distant Location (provider location):  On-site      SUBJECTIVE:                                                   Radha Justice is a 29 year old female who presents for virtual visit today for the following health issue(s):  No chief complaint on file.      Additional information: discuss US results for irregular menses     HPI:  Phone call with patient regarding irregular menstrual cycles.  She had an ultrasound done on Friday.  She has been off of her birth control since .  She has had serum blood work for PCOS in the past that was unremarkable.  She is not preventing pregnancy but would like to start trying.  She is on day 45 of her cycle.    She denies any symptoms of heat or cold intolerance.  She denies any facial acne or darker facial hair.    Patient's last menstrual period was 2024 (exact date)..     Patient is sexually active, .  Using none for contraception.    reports that she has never smoked. She has never used smokeless tobacco.    Health maintenance updated:  yes    Today's PHQ-2 Score:       2024     6:04 PM   PHQ-2 (  [employer])   Q1: Little interest or pleasure in doing things 0   Q2: Feeling down, depressed or hopeless 0   PHQ-2 Score 0   Q1: Little interest or pleasure in doing things Not at all   Q2: Feeling down, depressed or hopeless Not at all   PHQ-2 Score 0     Today's PHQ-9 Score:       2023     1:12 PM   PHQ-9 SCORE   PHQ-9 Total Score 0     Today's JANET-7 Score:       2023     1:12 PM   JANET-7 SCORE   Total Score 0       Problem list and histories reviewed & adjusted, as indicated.  Additional history: as documented.    Patient Active Problem List   Diagnosis     Attention deficit hyperactivity disorder, inattentive type    Concussion with no loss of consciousness    Dysmenorrhea    Labral tear of shoulder    Shoulder instability     Past Surgical History:   Procedure Laterality Date    ORTHOPEDIC SURGERY  11/18/2021      Social History     Tobacco Use    Smoking status: Never    Smokeless tobacco: Never   Substance Use Topics    Alcohol use: Yes      Problem (# of Occurrences) Relation (Name,Age of Onset)    Hypertension (1) Mother (Rosa Vallejo)    Rheumatoid Arthritis (1) Mother (Rosa Vallejo)           Negative family history of: Breast Cancer, Colon Cancer              Current Outpatient Medications   Medication Sig Dispense Refill    EPINEPHrine (ANY BX GENERIC EQUIV) 0.3 MG/0.3ML injection 2-pack Inject 0.3 mLs (0.3 mg) into the muscle as needed for anaphylaxis May repeat one time in 5-15 minutes if response to initial dose is inadequate. 2 each 4    ondansetron (ZOFRAN ODT) 4 MG ODT tab Take 1 tablet (4 mg) by mouth every 8 hours as needed for nausea 40 tablet 2     No current facility-administered medications for this visit.     No Known Allergies      OBJECTIVE:     No vitals were obtained today due to virtual telephone visit.    Physical Exam  GENERAL: alert and no distress  EYES: Eyes grossly normal to inspection.  No discharge or erythema, or obvious scleral/conjunctival abnormalities.  RESP: No audible wheeze, cough, or visible cyanosis.    SKIN: Visible skin clear. No significant rash, abnormal pigmentation or lesions.  NEURO: Cranial nerves grossly intact.  Mentation and speech appropriate for age.  PSYCH: Appropriate affect, tone, and pace of words        ASSESSMENT/PLAN:                                                      Phone call duration: 15 minutes      ICD-10-CM    1. Irregular menses  N92.6 Androstenedione     Dehydroepiandrosterone          There are no Patient Instructions on file for this visit.    29-year-old female with a thin EMS on  ultrasound.  Her ovaries show multiple follicles.  I question whether this is true PCOS or antral follicles.  Uterus is normal size.  We have asked her to complete her day 3 labs when she gets her next cycle and we will follow-up as appropriate.  PCOS and metformin management were discussed briefly with the patient.    ANA Kitchen CNP  [facility] [place]

## 2024-08-22 ENCOUNTER — OFFICE VISIT (OUTPATIENT)
Dept: ALLERGY | Facility: CLINIC | Age: 29
End: 2024-08-22
Payer: COMMERCIAL

## 2024-08-22 VITALS
WEIGHT: 155.42 LBS | DIASTOLIC BLOOD PRESSURE: 68 MMHG | HEART RATE: 60 BPM | SYSTOLIC BLOOD PRESSURE: 102 MMHG | OXYGEN SATURATION: 99 % | BODY MASS INDEX: 25.67 KG/M2

## 2024-08-22 DIAGNOSIS — L50.9 URTICARIA: Primary | ICD-10-CM

## 2024-08-22 PROCEDURE — 99202 OFFICE O/P NEW SF 15 MIN: CPT | Performed by: INTERNAL MEDICINE

## 2024-08-22 RX ORDER — CETIRIZINE HYDROCHLORIDE 5 MG/1
5 TABLET ORAL DAILY
COMMUNITY

## 2024-08-22 RX ORDER — DIPHENHYDRAMINE HCL 25 MG
25 CAPSULE ORAL EVERY 6 HOURS PRN
COMMUNITY

## 2024-08-22 NOTE — LETTER
8/22/2024      Radha Justice  [address]  [place] [address]      Dear Colleague,    Thank you for referring your patient, Radha Justice, to the [facility] [place]. Please see a copy of my visit note below.    Radha Justice was seen in the Allergy Clinic at [facility].    Radha Justice is a 29 year old female being seen today in consultation for hives.    She has had approximately 7 episodes since January 2024.  It seemed to be happening monthly but there was not a obvious correlation with her menstrual cycle.  She is not taking any medications intermittently such as NSAIDs during the times of the urticaria.  There were no vaccinations given recently.  There is no illnesses that correlate with the urticaria.  She has photographs on her phone that look consistent with hives with erythema and raised lesions and they did itch on most occasions.  There were a few episodes where her skin looks sunburned on her face and hands that would last for about 2 hours and then resolve.  There is no scarring that occurred with the episodes.  She has not had any hives or rash since June 11.  There is no new topical products started and no new shampoos or detergents or soaps started.  She did not have a new job or new home at this time.  No recent stressors.  Episodes were typically before noon.  There is no correlation with particular foods.  She did not have a recent tick bite.  She is tolerating meat products without any problems.  She has had lab work that included IgE which was slightly elevated and also positive testing to cat and dog and dust mites.  She does not have any significant allergy symptoms such as rhinorrhea or sneezing.    She was prescribed epinephrine due to an episode of lip swelling that occurred in May.  She has not needed to use this.  She does use Zyrtec or hydroxyzine or Benadryl as needed.  They seem to provide some benefit.   Episodes resolve on their own without medications.  She is not taking any new supplements.  She is not taking any new medications.    Additional labs included normal CRP as well as TSH, CBC except for the slightly decreased platelet count.  An AMPARO was also normal.  HIV and hepatitis C were also normal.      History reviewed. No pertinent past medical history.  Family History   Problem Relation Age of Onset     Hypertension Mother      Rheumatoid Arthritis Mother      Breast Cancer No family hx of      Colon Cancer No family hx of      Past Surgical History:   Procedure Laterality Date     ORTHOPEDIC SURGERY  11/18/2021       ENVIRONMENTAL HISTORY:   Pets inside the house include 1 dog(s).  Do you smoke cigarettes or other recreational drugs? No There is/are 0 smokers living in the house. The house does have a damp basement.     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      Review of Systems      Current Outpatient Medications:      cetirizine (ZYRTEC) 5 MG tablet, Take 5 mg by mouth daily., Disp: , Rfl:      diphenhydrAMINE (BENADRYL) 25 MG capsule, Take 25 mg by mouth every 6 hours as needed for itching or allergies., Disp: , Rfl:      ondansetron (ZOFRAN ODT) 4 MG ODT tab, Take 1 tablet (4 mg) by mouth every 8 hours as needed for nausea, Disp: 40 tablet, Rfl: 2     EPINEPHrine (ANY BX GENERIC EQUIV) 0.3 MG/0.3ML injection 2-pack, Inject 0.3 mLs (0.3 mg) into the muscle as needed for anaphylaxis May repeat one time in 5-15 minutes if response to initial dose is inadequate., Disp: 2 each, Rfl: 4  No Known Allergies      EXAM:   /68   Pulse 60   Wt 70.5 kg (155 lb 6.8 oz)   LMP 06/30/2024 (Exact Date)   SpO2 99%   BMI 25.67 kg/m      Physical Exam    Constitutional:       General: She is not in acute distress.     Appearance: Normal appearance. She is not ill-appearing.   HENT:      Head: Normocephalic and atraumatic.      Nose: Nose normal. No congestion or  rhinorrhea.      Mouth/Throat:      Mouth: Mucous membranes are moist.      Pharynx: Oropharynx is clear. No posterior oropharyngeal erythema.   Eyes:      General:         Right eye: No discharge.         Left eye: No discharge.   Cardiovascular:      Rate and Rhythm: Normal rate and regular rhythm.      Heart sounds: Normal heart sounds.   Pulmonary:      Effort: Pulmonary effort is normal.      Breath sounds: Normal breath sounds. No wheezing or rhonchi.   Skin:     General: Skin is warm.      Findings: No erythema or rash.   Neurological:      General: No focal deficit present.      Mental Status: She is alert. Mental status is at baseline.   Psychiatric:         Mood and Affect: Mood normal.         Behavior: Behavior normal.       Latest Reference Range & Units 06/14/24 13:55   CRP Inflammation <5.00 mg/L <3.00   IGE 0 - 114 kU/L  0 - 114 kU/L 471 (H)  445 (H)   TSH 0.30 - 4.20 uIU/mL 1.05   Allergen A alternata <0.10 KU(A)/L <0.10   Allergen A fumigatus <0.10 KU(A)/L <0.10   Allergen [place] <0.10 KU(A)/L <0.10   Allergen, [place] Grass <0.10 KU(A)/L <0.10   Allergen C herbarum <0.10 KU(A)/L <0.10   Allergen Cashew <0.10 KU(A)/L <0.10   Allergen Cat Dander <0.10 KU(A)/L 4.35 (H)   Allergen Cockroach <0.10 KU(A)/L 0.13 (H)   Allergen D farinae <0.10 KU(A)/L 16.60 (H)   Allergen, D Pteronyssinus <0.10 KU(A)/L 14.10 (H)   Allergen Dog Dander <0.10 KU(A)/L 0.26 (H)   Allergen Egg White <0.10 KU(A)/L <0.10   Allergen Elm <0.10 KU(A)/L <0.10   Allergen Fish(Cod) <0.10 KU(A)/L <0.10   Allergen Maple <0.10 KU(A)/L <0.10   Allergen Milk <0.10 KU(A)/L <0.10   Allergen, Mtn [place] <0.10 KU(A)/L <0.10   Allergen Oak(white) <0.10 KU(A)/L <0.10   Allergen P notatum <0.10 KU(A)/L <0.10   Allergen Peanut <0.10 KU(A)/L <0.10   Allergen Scallop <0.10 KU(A)/L <0.10   Allergen Shrimp <0.10 KU(A)/L <0.10   Allergen Soybean IgE <0.10 KU(A)/L <0.10   Allergen Chris <0.10 KU(A)/L <0.10   Allergen Tree White [place] IgE <0.10  KU(A)/L <0.10   Allergen Tuna <0.10 KU(A)/L <0.10   Allergen Weed [place]ettle IgE <0.10 KU(A)/L <0.10   Allergen Wheat <0.10 KU(A)/L <0.10   Allergen [place] <0.10 KU(A)/L <0.10   Allergen, Hazelnut <0.10 KU(A)/L <0.10   Allergen [place] <0.10 KU(A)/L <0.10   Allergen, Mouse Urine <0.10 KU(A)/L <0.10   Allergen, Ragweed Short <0.10 KU(A)/L <0.10   Allergen Russian Thistle <0.10 KU(A)/L <0.10   Allergen, [place] <0.10 KU(A)/L <0.10   Allergen, Sesame Seed <0.10 KU(A)/L <0.10   Allergen, [place] <0.10 KU(A)/L <0.10   Allergen, [place] <0.10 KU(A)/L <0.10   Allergen White [place] <0.10 KU(A)/L <0.10   WBC 4.0 - 11.0 10e3/uL 6.8   Hemoglobin 11.7 - 15.7 g/dL 12.0   Hematocrit 35.0 - 47.0 % 35.3   Platelet Count 150 - 450 10e3/uL 128 (L)   RBC Count 3.80 - 5.20 10e6/uL 3.86   MCV 78 - 100 fL 92   MCH 26.5 - 33.0 pg 31.1   MCHC 31.5 - 36.5 g/dL 34.0   RDW 10.0 - 15.0 % 11.2   % Neutrophils % 58   % Lymphocytes % 35   % Monocytes % 4   % Eosinophils % 2   % Basophils % 0   Absolute Basophils 0.0 - 0.2 10e3/uL 0.0   Absolute Eosinophils 0.0 - 0.7 10e3/uL 0.1   Absolute Immature Granulocytes <=0.4 10e3/uL 0.0   Absolute Lymphocytes 0.8 - 5.3 10e3/uL 2.4   Absolute Monocytes 0.0 - 1.3 10e3/uL 0.3   % Immature Granulocytes % 0   Absolute Neutrophils 1.6 - 8.3 10e3/uL 3.9   Sed Rate 0 - 20 mm/hr 6   Color Urine Colorless, Straw, Light Yellow, Yellow  Yellow   Appearance Urine Clear  Clear   Glucose Urine Negative mg/dL Negative   Bilirubin Urine Negative  Negative   Ketones Urine Negative mg/dL Negative   Specific Gravity Urine 1.003 - 1.035  1.015   pH Urine 5.0 - 7.0  7.0   Protein Albumin Urine Negative mg/dL Negative   Urobilinogen Urine 0.2, 1.0 E.U./dL 0.2   Nitrite Urine Negative  Negative   Blood Urine Negative  Trace !   Leukocyte Esterase Urine Negative  Negative   WBC Urine 0-5 /HPF /HPF 0-5   RBC Urine 0-2 /HPF /HPF 0-2   Bacteria Urine None Seen /HPF Few !   Squamous Epithelial /LPF Urine None Seen /LPF Few !    Hepatitis C Antibody Nonreactive  Nonreactive   HIV Antigen Antibody Combo Nonreactive  Nonreactive   ANTI NUCLEAR OSMIN IGG BY IFA WITH REFLEX  Rpt   DNA-ds <10.0 IU/mL 1.2   AMPARO interpretation Negative  Negative   (H): Data is abnormally high  (L): Data is abnormally low  !: Data is abnormal  Rpt: View report in Results Review for more information    ASSESSMENT/PLAN:  Radha Justice is a 29 year old female seen today for evaluation of hives on 7 occasions that are short-lived for 1 to 2 hours and resolved without any residual marks.  She is not having any systemic symptoms.  No obvious trigger based on her history.  No additional testing will take place.  Recommend Zyrtec 10 mg up to 20 mg as needed for hives in the future.    Follow-up will be arranged if hives return and are not controlled with Zyrtec.      Thank you for allowing me to participate in the care of Radha Justice.      I spent 25 minutes on the date of the encounter doing chart review, history and exam, documentation and further coordination as noted above exclusive of separately reported interpretations    Radhames Peter MD  Allergy/Immunology  [facility]      Again, thank you for allowing me to participate in the care of your patient.        Sincerely,        Radhames Peter MD

## 2024-08-22 NOTE — PATIENT INSTRUCTIONS
Allergy Staff Appt Hours Shot Hours Location       Physician   Radhames Peter MD      Support Staff   MARTINA Kelly RN, MA Emily J., MA      Mondays Tuesdays Thursdays and Fridays:      Miranda 7-5      Wednesdays         Close                Mondays, Tuesdays and Fridays:  7:20 - 3:40              [facility]  [address]  [place] [address]  Allergy appointment  line: [phone]    Pulmonary Function Scheduling:  [place]: [phone]           Questions about cost of your care  For questions about your cost of your visit, procedure, lab or imaging contact: [employer] Line [phone] or visit:  [email or web address]    Prescription Assistance  If you need assistance with your prescriptions (cost, coverage, etc) please contact: [employer] Prescription Assistance Program [phone]    Important Scheduling Information  All visits for food challenges, medication/drug allergy testing, and drug challenges MUST be scheduled through the allergy clinic nurse. Please contact them via [employer] or by calling the clinic at [phone] and asking to speak with an allergy nurse. They will provide additional information and instructions for the appointment. Discontinue oral antihistamines 7 days prior to the appointment. Discontinue nasal and ocular antihistamines 1 day prior to the appointment.    Appointments for skin testing: Appointment will last approximately 45 minutes.  Please call the appointment line for your clinic to schedule.  Discontinue oral antihistamines 7 days prior to the appointment.  Discontinue nasal and ocular antihistamines 1 days prior to appointment.    Thank you for trusting us with your care. Please feel free to contact us with any questions or concerns you may have.

## 2024-08-22 NOTE — PROGRESS NOTES
Radha Justice was seen in the Allergy Clinic at [facility].    Radha Justice is a 29 year old female being seen today in consultation for hives.    She has had approximately 7 episodes since January 2024.  It seemed to be happening monthly but there was not a obvious correlation with her menstrual cycle.  She is not taking any medications intermittently such as NSAIDs during the times of the urticaria.  There were no vaccinations given recently.  There is no illnesses that correlate with the urticaria.  She has photographs on her phone that look consistent with hives with erythema and raised lesions and they did itch on most occasions.  There were a few episodes where her skin looks sunburned on her face and hands that would last for about 2 hours and then resolve.  There is no scarring that occurred with the episodes.  She has not had any hives or rash since June 11.  There is no new topical products started and no new shampoos or detergents or soaps started.  She did not have a new job or new home at this time.  No recent stressors.  Episodes were typically before noon.  There is no correlation with particular foods.  She did not have a recent tick bite.  She is tolerating meat products without any problems.  She has had lab work that included IgE which was slightly elevated and also positive testing to cat and dog and dust mites.  She does not have any significant allergy symptoms such as rhinorrhea or sneezing.    She was prescribed epinephrine due to an episode of lip swelling that occurred in May.  She has not needed to use this.  She does use Zyrtec or hydroxyzine or Benadryl as needed.  They seem to provide some benefit.  Episodes resolve on their own without medications.  She is not taking any new supplements.  She is not taking any new medications.    Additional labs included normal CRP as well as TSH, CBC except for the slightly decreased platelet count.  An AMPARO was also  normal.  HIV and hepatitis C were also normal.      History reviewed. No pertinent past medical history.  Family History   Problem Relation Age of Onset    Hypertension Mother     Rheumatoid Arthritis Mother     Breast Cancer No family hx of     Colon Cancer No family hx of      Past Surgical History:   Procedure Laterality Date    ORTHOPEDIC SURGERY  11/18/2021       ENVIRONMENTAL HISTORY:   Pets inside the house include 1 dog(s).  Do you smoke cigarettes or other recreational drugs? No There is/are 0 smokers living in the house. The house does have a damp basement.     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      Review of Systems      Current Outpatient Medications:     cetirizine (ZYRTEC) 5 MG tablet, Take 5 mg by mouth daily., Disp: , Rfl:     diphenhydrAMINE (BENADRYL) 25 MG capsule, Take 25 mg by mouth every 6 hours as needed for itching or allergies., Disp: , Rfl:     ondansetron (ZOFRAN ODT) 4 MG ODT tab, Take 1 tablet (4 mg) by mouth every 8 hours as needed for nausea, Disp: 40 tablet, Rfl: 2    EPINEPHrine (ANY BX GENERIC EQUIV) 0.3 MG/0.3ML injection 2-pack, Inject 0.3 mLs (0.3 mg) into the muscle as needed for anaphylaxis May repeat one time in 5-15 minutes if response to initial dose is inadequate., Disp: 2 each, Rfl: 4  No Known Allergies      EXAM:   /68   Pulse 60   Wt 70.5 kg (155 lb 6.8 oz)   LMP 06/30/2024 (Exact Date)   SpO2 99%   BMI 25.67 kg/m      Physical Exam    Constitutional:       General: She is not in acute distress.     Appearance: Normal appearance. She is not ill-appearing.   HENT:      Head: Normocephalic and atraumatic.      Nose: Nose normal. No congestion or rhinorrhea.      Mouth/Throat:      Mouth: Mucous membranes are moist.      Pharynx: Oropharynx is clear. No posterior oropharyngeal erythema.   Eyes:      General:         Right eye: No discharge.         Left eye: No discharge.   Cardiovascular:      Rate and Rhythm: Normal  rate and regular rhythm.      Heart sounds: Normal heart sounds.   Pulmonary:      Effort: Pulmonary effort is normal.      Breath sounds: Normal breath sounds. No wheezing or rhonchi.   Skin:     General: Skin is warm.      Findings: No erythema or rash.   Neurological:      General: No focal deficit present.      Mental Status: She is alert. Mental status is at baseline.   Psychiatric:         Mood and Affect: Mood normal.         Behavior: Behavior normal.       Latest Reference Range & Units 06/14/24 13:55   CRP Inflammation <5.00 mg/L <3.00   IGE 0 - 114 kU/L  0 - 114 kU/L 471 (H)  445 (H)   TSH 0.30 - 4.20 uIU/mL 1.05   Allergen A alternata <0.10 KU(A)/L <0.10   Allergen A fumigatus <0.10 KU(A)/L <0.10   Allergen Savoy <0.10 KU(A)/L <0.10   Allergen, [place] Grass <0.10 KU(A)/L <0.10   Allergen C herbarum <0.10 KU(A)/L <0.10   Allergen Cashew <0.10 KU(A)/L <0.10   Allergen Cat Dander <0.10 KU(A)/L 4.35 (H)   Allergen Cockroach <0.10 KU(A)/L 0.13 (H)   Allergen D farinae <0.10 KU(A)/L 16.60 (H)   Allergen, D Pteronyssinus <0.10 KU(A)/L 14.10 (H)   Allergen Dog Dander <0.10 KU(A)/L 0.26 (H)   Allergen Egg White <0.10 KU(A)/L <0.10   Allergen Elm <0.10 KU(A)/L <0.10   Allergen Fish(Cod) <0.10 KU(A)/L <0.10   Allergen Maple <0.10 KU(A)/L <0.10   Allergen Milk <0.10 KU(A)/L <0.10   Allergen, Mtn [place] <0.10 KU(A)/L <0.10   Allergen Oak(white) <0.10 KU(A)/L <0.10   Allergen P notatum <0.10 KU(A)/L <0.10   Allergen Peanut <0.10 KU(A)/L <0.10   Allergen Scallop <0.10 KU(A)/L <0.10   Allergen Shrimp <0.10 KU(A)/L <0.10   Allergen Soybean IgE <0.10 KU(A)/L <0.10   Allergen Chris <0.10 KU(A)/L <0.10   Allergen Tree [employer] IgE <0.10 KU(A)/L <0.10   Allergen Tuna <0.10 KU(A)/L <0.10   Allergen Weed Nettle IgE <0.10 KU(A)/L <0.10   Allergen Wheat <0.10 KU(A)/L <0.10   Allergen Sasabe <0.10 KU(A)/L <0.10   Allergen, Hazelnut <0.10 KU(A)/L <0.10   Allergen Marshelder <0.10 KU(A)/L <0.10   Allergen, Mouse Urine  <0.10 KU(A)/L <0.10   Allergen, Ragweed Short <0.10 KU(A)/L <0.10   Allergen Russian Thistle <0.10 KU(A)/L <0.10   Allergen, [place] <0.10 KU(A)/L <0.10   Allergen, Sesame Seed <0.10 KU(A)/L <0.10   Allergen, Silver Birch <0.10 KU(A)/L <0.10   Allergen, [place] <0.10 KU(A)/L <0.10   Allergen White [place] <0.10 KU(A)/L <0.10   WBC 4.0 - 11.0 10e3/uL 6.8   Hemoglobin 11.7 - 15.7 g/dL 12.0   Hematocrit 35.0 - 47.0 % 35.3   Platelet Count 150 - 450 10e3/uL 128 (L)   RBC Count 3.80 - 5.20 10e6/uL 3.86   MCV 78 - 100 fL 92   MCH 26.5 - 33.0 pg 31.1   MCHC 31.5 - 36.5 g/dL 34.0   RDW 10.0 - 15.0 % 11.2   % Neutrophils % 58   % Lymphocytes % 35   % Monocytes % 4   % Eosinophils % 2   % Basophils % 0   Absolute Basophils 0.0 - 0.2 10e3/uL 0.0   Absolute Eosinophils 0.0 - 0.7 10e3/uL 0.1   Absolute Immature Granulocytes <=0.4 10e3/uL 0.0   Absolute Lymphocytes 0.8 - 5.3 10e3/uL 2.4   Absolute Monocytes 0.0 - 1.3 10e3/uL 0.3   % Immature Granulocytes % 0   Absolute Neutrophils 1.6 - 8.3 10e3/uL 3.9   Sed Rate 0 - 20 mm/hr 6   Color Urine Colorless, Straw, Light Yellow, Yellow  Yellow   Appearance Urine Clear  Clear   Glucose Urine Negative mg/dL Negative   Bilirubin Urine Negative  Negative   Ketones Urine Negative mg/dL Negative   Specific Gravity Urine 1.003 - 1.035  1.015   pH Urine 5.0 - 7.0  7.0   Protein Albumin Urine Negative mg/dL Negative   Urobilinogen Urine 0.2, 1.0 E.U./dL 0.2   Nitrite Urine Negative  Negative   Blood Urine Negative  Trace !   Leukocyte Esterase Urine Negative  Negative   WBC Urine 0-5 /HPF /HPF 0-5   RBC Urine 0-2 /HPF /HPF 0-2   Bacteria Urine None Seen /HPF Few !   Squamous Epithelial /LPF Urine None Seen /LPF Few !   Hepatitis C Antibody Nonreactive  Nonreactive   HIV Antigen Antibody Combo Nonreactive  Nonreactive   ANTI NUCLEAR OSMIN IGG BY IFA WITH REFLEX  Rpt   DNA-ds <10.0 IU/mL 1.2   AMPARO interpretation Negative  Negative   (H): Data is abnormally high  (L): Data is abnormally low  !: Data is  abnormal  Rpt: View report in Results Review for more information    ASSESSMENT/PLAN:  Radha Justice is a 29 year old female seen today for evaluation of hives on 7 occasions that are short-lived for 1 to 2 hours and resolved without any residual marks.  She is not having any systemic symptoms.  No obvious trigger based on her history.  No additional testing will take place.  Recommend Zyrtec 10 mg up to 20 mg as needed for hives in the future.    Follow-up will be arranged if hives return and are not controlled with Zyrtec.      Thank you for allowing me to participate in the care of Radha Justice.      I spent 25 minutes on the date of the encounter doing chart review, history and exam, documentation and further coordination as noted above exclusive of separately reported interpretations    Radhames Peter MD  Allergy/Immunology  [facility]

## 2024-09-09 ENCOUNTER — LAB (OUTPATIENT)
Dept: LAB | Facility: CLINIC | Age: 29
End: 2024-09-09
Payer: COMMERCIAL

## 2024-09-09 DIAGNOSIS — N92.6 IRREGULAR MENSES: ICD-10-CM

## 2024-09-09 DIAGNOSIS — D69.6 LOW PLATELET COUNT (H): ICD-10-CM

## 2024-09-09 LAB
ERYTHROCYTE [DISTWIDTH] IN BLOOD BY AUTOMATED COUNT: 11.7 % (ref 10–15)
ESTRADIOL SERPL-MCNC: 50 PG/ML
FSH SERPL IRP2-ACNC: 5.5 MIU/ML
HCT VFR BLD AUTO: 37.3 % (ref 35–47)
HGB BLD-MCNC: 12.5 G/DL (ref 11.7–15.7)
MCH RBC QN AUTO: 31.7 PG (ref 26.5–33)
MCHC RBC AUTO-ENTMCNC: 33.5 G/DL (ref 31.5–36.5)
MCV RBC AUTO: 95 FL (ref 78–100)
MIS SERPL-MCNC: 4.04 NG/ML (ref 0.89–9.9)
PLATELET # BLD AUTO: 138 10E3/UL (ref 150–450)
PROGEST SERPL-MCNC: 0.7 NG/ML
PROLACTIN SERPL 3RD IS-MCNC: 49 NG/ML (ref 5–23)
RBC # BLD AUTO: 3.94 10E6/UL (ref 3.8–5.2)
SHBG SERPL-SCNC: 114 NMOL/L (ref 30–135)
TSH SERPL DL<=0.005 MIU/L-ACNC: 1.01 UIU/ML (ref 0.3–4.2)
WBC # BLD AUTO: 4.8 10E3/UL (ref 4–11)

## 2024-09-09 PROCEDURE — 84443 ASSAY THYROID STIM HORMONE: CPT

## 2024-09-09 PROCEDURE — 84144 ASSAY OF PROGESTERONE: CPT

## 2024-09-09 PROCEDURE — 36415 COLL VENOUS BLD VENIPUNCTURE: CPT

## 2024-09-09 PROCEDURE — 84146 ASSAY OF PROLACTIN: CPT

## 2024-09-09 PROCEDURE — 84403 ASSAY OF TOTAL TESTOSTERONE: CPT

## 2024-09-09 PROCEDURE — 82157 ASSAY OF ANDROSTENEDIONE: CPT | Mod: 90

## 2024-09-09 PROCEDURE — 85027 COMPLETE CBC AUTOMATED: CPT

## 2024-09-09 PROCEDURE — 82670 ASSAY OF TOTAL ESTRADIOL: CPT

## 2024-09-09 PROCEDURE — 99000 SPECIMEN HANDLING OFFICE-LAB: CPT

## 2024-09-09 PROCEDURE — 82626 DEHYDROEPIANDROSTERONE: CPT | Mod: 90

## 2024-09-09 PROCEDURE — 83001 ASSAY OF GONADOTROPIN (FSH): CPT

## 2024-09-09 PROCEDURE — 84270 ASSAY OF SEX HORMONE GLOBUL: CPT

## 2024-09-09 PROCEDURE — 82166 ASSAY ANTI-MULLERIAN HORM: CPT

## 2024-09-11 LAB
TESTOST FREE SERPL-MCNC: 0.21 NG/DL
TESTOST SERPL-MCNC: 28 NG/DL (ref 8–60)

## 2024-09-12 ENCOUNTER — MYC MEDICAL ADVICE (OUTPATIENT)
Dept: OBGYN | Facility: CLINIC | Age: 29
End: 2024-09-12
Payer: COMMERCIAL

## 2024-09-12 DIAGNOSIS — R79.89 ELEVATED PROLACTIN LEVEL: Primary | ICD-10-CM

## 2024-09-12 DIAGNOSIS — N92.6 IRREGULAR MENSES: ICD-10-CM

## 2024-09-12 NOTE — TELEPHONE ENCOUNTER
Routing pt Speedyboyhart message to provider to advise.  Patricia Vazquez RN on 9/12/2024 at 10:24 AM   [facility]

## 2024-09-12 NOTE — TELEPHONE ENCOUNTER
Called patient back.  She did take Zofran oral dissolving tablets 2 days prior and the day before her blood draw.  Upon research this can affect her prolactin level.  We did schedule her brain MRI and will have her repeat a prolactin level the week of her MRI.  We discussed if her prolactin level is normal we would cancel the MRI and look at starting her on metformin based on her ultrasound results of suggestive PCOS.    Of note the patient's  has not had a semen analysis.

## 2024-09-12 NOTE — RESULT ENCOUNTER NOTE
Hello,    Your results were normal.  The complete blood count was stable slightly improved from the last time.  The platelets also slightly improved they are still a little low but nothing that is concerning.  The rest of your labs all resulted to your other provider let me know if you have any questions and take care    Josephine Leo MD

## 2024-09-14 LAB
ANDROST SERPL-MCNC: 1.28 NG/ML
DHEA SERPL-MCNC: 4.39 NG/ML

## 2024-09-23 ENCOUNTER — LAB (OUTPATIENT)
Dept: LAB | Facility: CLINIC | Age: 29
End: 2024-09-23
Payer: COMMERCIAL

## 2024-09-23 DIAGNOSIS — R79.89 ELEVATED PROLACTIN LEVEL: ICD-10-CM

## 2024-09-23 LAB — PROLACTIN SERPL 3RD IS-MCNC: 27 NG/ML (ref 5–23)

## 2024-09-23 PROCEDURE — 84146 ASSAY OF PROLACTIN: CPT

## 2024-09-23 PROCEDURE — 36415 COLL VENOUS BLD VENIPUNCTURE: CPT

## 2024-09-27 ENCOUNTER — HOSPITAL ENCOUNTER (OUTPATIENT)
Dept: MRI IMAGING | Facility: CLINIC | Age: 29
Discharge: HOME | End: 2024-09-27
Attending: NURSE PRACTITIONER | Admitting: NURSE PRACTITIONER
Payer: COMMERCIAL

## 2024-09-27 DIAGNOSIS — R79.89 ELEVATED PROLACTIN LEVEL: ICD-10-CM

## 2024-09-27 DIAGNOSIS — N92.6 IRREGULAR MENSES: ICD-10-CM

## 2024-09-27 PROCEDURE — 255N000002 HC RX 255 OP 636: Performed by: NURSE PRACTITIONER

## 2024-09-27 PROCEDURE — 70553 MRI BRAIN STEM W/O & W/DYE: CPT

## 2024-09-27 PROCEDURE — A9585 GADOBUTROL INJECTION: HCPCS | Performed by: NURSE PRACTITIONER

## 2024-09-27 RX ORDER — GADOBUTROL 604.72 MG/ML
7 INJECTION INTRAVENOUS ONCE
Status: COMPLETED | OUTPATIENT
Start: 2024-09-27 | End: 2024-09-27

## 2024-09-27 RX ADMIN — GADOBUTROL 7 ML: 604.72 INJECTION INTRAVENOUS at 07:23

## 2024-10-08 ENCOUNTER — TRANSFERRED RECORDS (OUTPATIENT)
Dept: HEALTH INFORMATION MANAGEMENT | Facility: CLINIC | Age: 29
End: 2024-10-08
Payer: COMMERCIAL

## 2024-10-15 ENCOUNTER — TRANSFERRED RECORDS (OUTPATIENT)
Dept: HEALTH INFORMATION MANAGEMENT | Facility: CLINIC | Age: 29
End: 2024-10-15
Payer: COMMERCIAL

## 2024-11-14 ENCOUNTER — LAB (OUTPATIENT)
Dept: LAB | Facility: CLINIC | Age: 29
End: 2024-11-14
Payer: COMMERCIAL

## 2024-11-14 DIAGNOSIS — N92.6 IRREGULAR MENSES: ICD-10-CM

## 2024-11-14 DIAGNOSIS — R79.89 ELEVATED PROLACTIN LEVEL: ICD-10-CM

## 2024-11-14 LAB — PROLACTIN SERPL 3RD IS-MCNC: 38 NG/ML (ref 5–23)

## 2024-11-14 PROCEDURE — 84146 ASSAY OF PROLACTIN: CPT

## 2024-11-14 PROCEDURE — 36415 COLL VENOUS BLD VENIPUNCTURE: CPT

## 2024-12-20 ENCOUNTER — TRANSFERRED RECORDS (OUTPATIENT)
Dept: HEALTH INFORMATION MANAGEMENT | Facility: CLINIC | Age: 29
End: 2024-12-20
Payer: COMMERCIAL

## 2025-01-31 ENCOUNTER — TRANSFERRED RECORDS (OUTPATIENT)
Dept: HEALTH INFORMATION MANAGEMENT | Facility: CLINIC | Age: 30
End: 2025-01-31
Payer: COMMERCIAL

## 2025-02-06 ENCOUNTER — LAB (OUTPATIENT)
Dept: LAB | Facility: CLINIC | Age: 30
End: 2025-02-06
Payer: COMMERCIAL

## 2025-02-06 DIAGNOSIS — N92.6 IRREGULAR MENSES: ICD-10-CM

## 2025-02-06 DIAGNOSIS — R79.89 ELEVATED PROLACTIN LEVEL: ICD-10-CM

## 2025-02-06 LAB
INSULIN SERPL-ACNC: 4.6 UU/ML (ref 2.6–24.9)
PROLACTIN SERPL 3RD IS-MCNC: 12 NG/ML (ref 5–23)

## 2025-05-07 ENCOUNTER — TELEPHONE (OUTPATIENT)
Dept: FAMILY MEDICINE | Facility: CLINIC | Age: 30
End: 2025-05-07
Payer: COMMERCIAL

## 2025-05-07 NOTE — PROGRESS NOTES
Left a message for the patient to call the lab. There are no orders in her chart and it is unclear who should be putting in the orders for her.

## 2025-05-08 ENCOUNTER — MYC MEDICAL ADVICE (OUTPATIENT)
Dept: FAMILY MEDICINE | Facility: CLINIC | Age: 30
End: 2025-05-08
Payer: COMMERCIAL

## 2025-05-08 DIAGNOSIS — O26.851 SPOTTING AFFECTING PREGNANCY IN FIRST TRIMESTER: Primary | ICD-10-CM

## 2025-05-08 NOTE — TELEPHONE ENCOUNTER
SN/ MP(DOD),  Please see below Videoflothart message and advise.  Patient is planning to follow with OBGYN for pregnancy  Thanks,  Harriett WIGGINS RN

## 2025-05-08 NOTE — TELEPHONE ENCOUNTER
"Ok to do a lab for peace of mind, order placed  Thanks  Slime \"Daniel\" MARIA EUGEINA Terrazas   "

## 2025-05-09 ENCOUNTER — RESULTS FOLLOW-UP (OUTPATIENT)
Dept: FAMILY MEDICINE | Facility: CLINIC | Age: 30
End: 2025-05-09

## 2025-05-09 DIAGNOSIS — O26.851 SPOTTING AFFECTING PREGNANCY IN FIRST TRIMESTER: Primary | ICD-10-CM

## 2025-05-09 NOTE — TELEPHONE ENCOUNTER
26-Jul-2021 08:28 MP,  Please see below Seriously message and advise.  Thanks,  Mariza GRIDER RN

## 2025-05-09 NOTE — RESULT ENCOUNTER NOTE
"Jason Cai,  Your attached HCG levels are elevated, but it's hard to tell what direction they are going with one level.  How are you feeling? It might be a good idea to repeat this level with another lab only appointment early next week.  I'll place the order so you have that option.    Please contact the office with any questions or concerns.    Slime Saeed \"Daniel\" MARIA EUGENIA Terrazas for Dr. Leo while she is out of office      "

## 2025-05-11 ENCOUNTER — LAB (OUTPATIENT)
Dept: LAB | Facility: CLINIC | Age: 30
End: 2025-05-11
Payer: COMMERCIAL

## 2025-05-11 DIAGNOSIS — O26.851 SPOTTING AFFECTING PREGNANCY IN FIRST TRIMESTER: ICD-10-CM

## 2025-05-11 LAB — HCG INTACT+B SERPL-ACNC: 289 MIU/ML

## 2025-05-11 PROCEDURE — 36415 COLL VENOUS BLD VENIPUNCTURE: CPT

## 2025-05-11 PROCEDURE — 84702 CHORIONIC GONADOTROPIN TEST: CPT

## 2025-05-12 ENCOUNTER — RESULTS FOLLOW-UP (OUTPATIENT)
Dept: FAMILY MEDICINE | Facility: CLINIC | Age: 30
End: 2025-05-12
Payer: COMMERCIAL

## 2025-05-12 DIAGNOSIS — O26.851 SPOTTING AFFECTING PREGNANCY IN FIRST TRIMESTER: Primary | ICD-10-CM

## 2025-05-13 NOTE — TELEPHONE ENCOUNTER
Daniel,    Please see below Room 8 Studiot message and advise.   Pt self-scheduled for hcg lab appts on 5/15, 5/20, and 5/29.   Does not appear to have future lab orders in chart    ThanksMari RN

## 2025-05-15 ENCOUNTER — LAB (OUTPATIENT)
Dept: LAB | Facility: CLINIC | Age: 30
End: 2025-05-15
Payer: COMMERCIAL

## 2025-05-15 DIAGNOSIS — O26.851 SPOTTING AFFECTING PREGNANCY IN FIRST TRIMESTER: ICD-10-CM

## 2025-05-19 ENCOUNTER — LAB (OUTPATIENT)
Dept: LAB | Facility: CLINIC | Age: 30
End: 2025-05-19
Payer: COMMERCIAL

## 2025-05-19 ENCOUNTER — RESULTS FOLLOW-UP (OUTPATIENT)
Dept: FAMILY MEDICINE | Facility: CLINIC | Age: 30
End: 2025-05-19

## 2025-05-19 DIAGNOSIS — O26.851 SPOTTING AFFECTING PREGNANCY IN FIRST TRIMESTER: ICD-10-CM

## 2025-05-19 LAB — HCG INTACT+B SERPL-ACNC: 5944 MIU/ML

## 2025-05-19 PROCEDURE — 84702 CHORIONIC GONADOTROPIN TEST: CPT

## 2025-05-19 PROCEDURE — 36415 COLL VENOUS BLD VENIPUNCTURE: CPT

## 2025-05-19 NOTE — RESULT ENCOUNTER NOTE
"Jason Cai,  Your attached labs continue to be heading in the right direction.  Please contact the office with any questions or concerns.    Slime Saeed \"Daniel\" MARIA EUGENIA Terrazas      " Carac Counseling:  I discussed with the patient the risks of Carac including but not limited to erythema, scaling, itching, weeping, crusting, and pain.

## 2025-05-20 ENCOUNTER — RESULTS FOLLOW-UP (OUTPATIENT)
Dept: FAMILY MEDICINE | Facility: CLINIC | Age: 30
End: 2025-05-20

## 2025-05-20 NOTE — RESULT ENCOUNTER NOTE
"Jason Cai,  Your attached labs are still increasing.  Please contact the office with any questions or concerns.    Slime Saeed \"Daniel\" MARIA EUGENIA Terrazas      "

## 2025-05-21 ASSESSMENT — ANXIETY QUESTIONNAIRES
4. TROUBLE RELAXING: NOT AT ALL
GAD7 TOTAL SCORE: 0
5. BEING SO RESTLESS THAT IT IS HARD TO SIT STILL: NOT AT ALL
3. WORRYING TOO MUCH ABOUT DIFFERENT THINGS: NOT AT ALL
8. IF YOU CHECKED OFF ANY PROBLEMS, HOW DIFFICULT HAVE THESE MADE IT FOR YOU TO DO YOUR WORK, TAKE CARE OF THINGS AT HOME, OR GET ALONG WITH OTHER PEOPLE?: NOT DIFFICULT AT ALL
GAD7 TOTAL SCORE: 0
2. NOT BEING ABLE TO STOP OR CONTROL WORRYING: NOT AT ALL
6. BECOMING EASILY ANNOYED OR IRRITABLE: NOT AT ALL
7. FEELING AFRAID AS IF SOMETHING AWFUL MIGHT HAPPEN: NOT AT ALL
GAD7 TOTAL SCORE: 0
1. FEELING NERVOUS, ANXIOUS, OR ON EDGE: NOT AT ALL
7. FEELING AFRAID AS IF SOMETHING AWFUL MIGHT HAPPEN: NOT AT ALL

## 2025-05-21 ASSESSMENT — PATIENT HEALTH QUESTIONNAIRE - PHQ9
SUM OF ALL RESPONSES TO PHQ QUESTIONS 1-9: 0
10. IF YOU CHECKED OFF ANY PROBLEMS, HOW DIFFICULT HAVE THESE PROBLEMS MADE IT FOR YOU TO DO YOUR WORK, TAKE CARE OF THINGS AT HOME, OR GET ALONG WITH OTHER PEOPLE: NOT DIFFICULT AT ALL
SUM OF ALL RESPONSES TO PHQ QUESTIONS 1-9: 0

## 2025-05-22 ENCOUNTER — VIRTUAL VISIT (OUTPATIENT)
Dept: OBGYN | Facility: CLINIC | Age: 30
End: 2025-05-22
Payer: COMMERCIAL

## 2025-05-22 VITALS — HEIGHT: 65 IN | BODY MASS INDEX: 25.83 KG/M2 | WEIGHT: 155 LBS

## 2025-05-22 DIAGNOSIS — Z13.79 GENETIC SCREENING: ICD-10-CM

## 2025-05-22 DIAGNOSIS — Z34.01 ENCOUNTER FOR SUPERVISION OF NORMAL FIRST PREGNANCY IN FIRST TRIMESTER: Primary | ICD-10-CM

## 2025-05-22 DIAGNOSIS — O36.80X0 PREGNANCY WITH INCONCLUSIVE FETAL VIABILITY: ICD-10-CM

## 2025-05-22 PROBLEM — Z34.00 SUPERVISION OF NORMAL IUP (INTRAUTERINE PREGNANCY) IN PRIMIGRAVIDA: Status: ACTIVE | Noted: 2025-05-22

## 2025-05-22 ASSESSMENT — ANXIETY QUESTIONNAIRES: GAD7 TOTAL SCORE: 0

## 2025-05-22 NOTE — ASSESSMENT & PLAN NOTE
**HR  FOB: [place]    MORGAN: Jan [birth year] 2026  BT Placenta:  Sex:   Genetic:ordered   Tdap:          Flu:           GBS:     Problems  F/U next visit

## 2025-05-22 NOTE — PROGRESS NOTES
SUBJECTIVE:     HPI:    This is a 30 year old female patient,  who presents for her first obstetrical visit.    MORGAN: 2025, by Last Menstrual Period.  She is 6w4d weeks.  Her cycles are irregular.  Her last menstrual period was normal.   Since her LMP, she has experienced  fatigue.    Additional History: first pregnancy-taking Melatonin for suspected PCOS    Have you travelled during the pregnancy?No  Have your sexual partner(s) travelled during the pregnancy?No    HISTORY:   Planned Pregnancy: Yes  Marital Status:   Occupation: RN   Living in Household: Spouse-Clay    Past History:  Her past medical history   Past Medical History:   Diagnosis Date    No known health problems    .      She has a history of  first delivery    Since her last LMP she denies use of alcohol, tobacco and street drugs.    Past medical, surgical, social and family history were reviewed and updated in [facility].      Current Outpatient Medications   Medication Sig Dispense Refill    metFORMIN (GLUCOPHAGE XR) 500 MG 24 hr tablet Take 1 tablet (500 mg) by mouth 2 times daily (with meals). 180 tablet 3    Prenat w/o [id number] (PNV OB+DHA PO) Take by mouth.       No current facility-administered medications for this visit.       ROS:   12 point review of systems negative other than symptoms noted below or in the HPI.  Constitutional: Fatigue    Confirmed patient desires delivery at [facility] with the [facility] provider.    Nurse phone visit completed. Prenatal book and folder (containing standard educational hand-outs and brochures) will be given at the next visit to patient. Information in folder reviewed over the phone. Questions answered. Information given on optional screening available to assess chromosomal anomalies. Pt desires NIPS. Pt advised to call the clinic if she has any questions or concerns related to her pregnancy. Prenatal labs future ordered.   Shruthi Syed RN on  "5/22/2025 at 1:09 PM      Lab Results   Component Value Date    PAP NIL 05/20/2021       PHQ-9 score:        5/21/2025     3:33 PM   PHQ   PHQ-9 Total Score 0    Q9: Thoughts of better off dead/self-harm past 2 weeks Not at all       Patient-reported                   4/6/2023     1:12 PM 5/21/2025     3:33 PM   JANET-7 SCORE   Total Score  0 (minimal anxiety)   Total Score 0 0        Patient-reported           Patient supplied answers from flow sheet for:  Prenatal OB Questionnaire.  Past Medical History  Have you ever recieved care for your mental health? : (Patient-Rptd) No  Have you ever been in a major accident or suffered serious trauma?: (Patient-Rptd) No  Within the last year, has anyone hit, slapped, kicked or otherwise hurt you?: (Patient-Rptd) No  In the last year, has anyone forced you to have sex when you didn't want to?: (Patient-Rptd) No    Past Medical History 2   Have you ever received a blood transfusion?: (Patient-Rptd) No  Would you accept a blood transfusion if was medically recommended?: (Patient-Rptd) Yes  Does anyone in your home smoke?: (Patient-Rptd) No   Is your blood type Rh negative?: (Patient-Rptd) Unknown  Have you ever ?: (Patient-Rptd) No  Have you been hospitalized for a nonsurgical reason excluding normal delivery?: (Patient-Rptd) No  Have you ever had an abnormal pap smear?: (Patient-Rptd) No    Past Medical History (Continued)  Do you have a history of abnormalities of the uterus?: (Patient-Rptd) No  Did your mother take CHADD or any other hormones when she was pregnant with you?: (Patient-Rptd) Unknown  Do you have any other problems we have not asked about which you feel may be important to this pregnancy?: (Patient-Rptd) No                   OBJECTIVE:     EXAM:  Ht 1.657 m (5' 5.25\")   Wt 70.3 kg (155 lb)   LMP 04/06/2025   BMI 25.60 kg/m   Body mass index is 25.6 kg/m .  "

## 2025-05-28 ENCOUNTER — LAB (OUTPATIENT)
Dept: LAB | Facility: CLINIC | Age: 30
End: 2025-05-28
Payer: COMMERCIAL

## 2025-05-28 DIAGNOSIS — O26.851 SPOTTING AFFECTING PREGNANCY IN FIRST TRIMESTER: ICD-10-CM

## 2025-05-28 PROCEDURE — 84702 CHORIONIC GONADOTROPIN TEST: CPT

## 2025-05-28 PROCEDURE — 36415 COLL VENOUS BLD VENIPUNCTURE: CPT

## 2025-05-29 LAB — HCG INTACT+B SERPL-ACNC: ABNORMAL MIU/ML

## 2025-06-02 ASSESSMENT — EDINBURGH POSTNATAL DEPRESSION SCALE (EPDS)
I HAVE FELT SCARED OR PANICKY FOR NO GOOD REASON: NO, NOT AT ALL
THE THOUGHT OF HARMING MYSELF HAS OCCURRED TO ME: NEVER
THINGS HAVE BEEN GETTING ON TOP OF ME: NO, I HAVE BEEN COPING AS WELL AS EVER
I HAVE BEEN SO UNHAPPY THAT I HAVE BEEN CRYING: NO, NEVER
I HAVE BLAMED MYSELF UNNECESSARILY WHEN THINGS WENT WRONG: NO, NEVER
I HAVE FELT SAD OR MISERABLE: NO, NOT AT ALL
I HAVE BEEN ABLE TO LAUGH AND SEE THE FUNNY SIDE OF THINGS: AS MUCH AS I ALWAYS COULD
TOTAL SCORE: 0
I HAVE LOOKED FORWARD WITH ENJOYMENT TO THINGS: AS MUCH AS I EVER DID
I HAVE BEEN ANXIOUS OR WORRIED FOR NO GOOD REASON: NO, NOT AT ALL
I HAVE BEEN SO UNHAPPY THAT I HAVE HAD DIFFICULTY SLEEPING: NOT AT ALL

## 2025-06-02 NOTE — PATIENT INSTRUCTIONS
Thank you for coming to see the Midwives at the   [facility]!    Please take prenatal vitamin with DHA once daily during the entire pregnancy.    We will notify you about your labs that were drawn when we get the results back.  If you have MyChart your lab results will be posted there. Someone from the clinic will send you a Imaxio message or call you personally with your results.    If you need any refills of medications please call your pharmacy, and they will contact us.    If you have a medical emergency please call 911.    If you have any concerns about today's visit, wish to schedule another appointment, or have an urgent medical concern please call our office at [phone]. You can also make appointments through Imaxio.    After hours you may also call the clinic number above to be connected with [place]'s after hours triage nurse. The nurse can page the midwife on call, if needed. There is always a midwife on call 24 hours a day.    Prenatal Care Recommendations:    Before 14 weeks: Dating ultrasound, genetic testing       This ultrasound helps us determine your dates accurately. Noninvasive prenatal testing (genetic screening test) can be drawn anytime after 10 weeks of gestation; this test can also predict the baby's sex.    16 weeks: Optional genetic testing AFP       This testing helps understand your baby's risk for some genetic abnormalities.    19-21 weeks:  Screening anatomy ultrasound       This testing will look for early growth abnormalities, placenta location, and may tell the baby's sex if you wish to find out.    24-27 weeks: One hour diabetes test (GCT) and complete blood count       This test helps identify diabetes of pregnancy or gestational diabetes.  We also look at the iron in your blood and how well your blood clots.    28 weeks: Tetanus shot (Tdap)       This shot helps protect you and your baby from whooping cough.    32 weeks: Respiratory syncytial virus  shot (RSV) (seasonal)       This shot helps protect you and your baby from RSV.    36 weeks and later: Group B Strep test (GBS)       This test helps predict if you need antibiotics in labor to prevent your baby from getting an infection.    Anytime September to April:  Flu shot       This shot helps protect you and your family from the flu.  This is especially important during pregnancy.        The typical schedule after your first visit today you can expect:     Visit 2 - 12-16 weeks  Visit 3 - 20 weeks  Visit 4 - 24 weeks  Visit 5 - 28 weeks  Visit 6 - 30 weeks  Visit 7 - 32 weeks  Visit 8 - 34 weeks  Visit 9 - 36 weeks  Weekly after 36 weeks until delivery.        Any time during or after your pregnancy you may experience increased depression and/or mood changes.    We are here to support you. Please contact us if you are:  Feeling anxious  Overwhelmed or sad   Trouble sleeping  Crying uncontrollably  Trouble caring for yourself or baby.  Any thoughts of hurting yourself, your baby, or anyone else    If anything comes up between your visits or you have concerns please don't hesitate to contact us.    Secure access to your medical record:  Use EagerPanda (secure email communication and access to your chart) to send your primary care provider a message or make an appointment. Ask someone in our office to sign up for EagerPanda. To log on to Edgar or for more information in EagerPanda please visit the website at [email or web address].       Certified Nurse Midwife (CNM) Team    Beata PEREZ, LUKE PEREZ, LUKE Lopez APRDEEDEE, LUKE, [facility]-BC  Yi Hernández DNP, APRN, LUKE Goodman DNP, APRN, LUKE Ramos DNP, APRN, EMILIANAM    Link to Midwifery Care website: [email or web address]      Again, thank you for choosing the midwives at [facility].  We are excited to be a part of your pregnancy! Please let us know how we can best  partner with you to improve your and your family's health.

## 2025-06-03 ENCOUNTER — RESULTS FOLLOW-UP (OUTPATIENT)
Dept: MIDWIFE SERVICES | Facility: CLINIC | Age: 30
End: 2025-06-03

## 2025-06-03 ENCOUNTER — PRENATAL OFFICE VISIT (OUTPATIENT)
Dept: MIDWIFE SERVICES | Facility: CLINIC | Age: 30
End: 2025-06-03
Payer: COMMERCIAL

## 2025-06-03 ENCOUNTER — ANCILLARY PROCEDURE (OUTPATIENT)
Dept: ULTRASOUND IMAGING | Facility: CLINIC | Age: 30
End: 2025-06-03
Payer: COMMERCIAL

## 2025-06-03 VITALS
WEIGHT: 159.4 LBS | BODY MASS INDEX: 25.02 KG/M2 | DIASTOLIC BLOOD PRESSURE: 68 MMHG | SYSTOLIC BLOOD PRESSURE: 114 MMHG | HEIGHT: 67 IN

## 2025-06-03 DIAGNOSIS — O36.80X0 PREGNANCY WITH INCONCLUSIVE FETAL VIABILITY: ICD-10-CM

## 2025-06-03 DIAGNOSIS — Z3A.01 7 WEEKS GESTATION OF PREGNANCY: ICD-10-CM

## 2025-06-03 DIAGNOSIS — N92.6 IRREGULAR PERIODS/MENSTRUAL CYCLES: ICD-10-CM

## 2025-06-03 DIAGNOSIS — Z34.01 ENCOUNTER FOR SUPERVISION OF NORMAL FIRST PREGNANCY IN FIRST TRIMESTER: Primary | ICD-10-CM

## 2025-06-03 LAB
ALBUMIN UR-MCNC: NEGATIVE MG/DL
APPEARANCE UR: CLEAR
BILIRUB UR QL STRIP: NEGATIVE
COLOR UR AUTO: YELLOW
GLUCOSE UR STRIP-MCNC: NEGATIVE MG/DL
HGB UR QL STRIP: NEGATIVE
KETONES UR STRIP-MCNC: NEGATIVE MG/DL
LEUKOCYTE ESTERASE UR QL STRIP: NEGATIVE
NITRATE UR QL: NEGATIVE
PH UR STRIP: 6.5 [PH] (ref 5–7)
SP GR UR STRIP: <=1.005 (ref 1–1.03)
UROBILINOGEN UR STRIP-ACNC: 0.2 E.U./DL

## 2025-06-03 PROCEDURE — 76817 TRANSVAGINAL US OBSTETRIC: CPT | Performed by: OBSTETRICS & GYNECOLOGY

## 2025-06-03 PROCEDURE — 87086 URINE CULTURE/COLONY COUNT: CPT

## 2025-06-03 PROCEDURE — 81003 URINALYSIS AUTO W/O SCOPE: CPT

## 2025-06-03 NOTE — PROGRESS NOTES
SUBJECTIVE:      HPI:     This is a 30 year old female patient,  who presents for her first obstetrical visit.     MORGAN: 2026, by Last Menstrual Period.  She is 6w4d weeks.  Her cycles are irregular.  Her last menstrual period was normal.   Since her LMP, she has experienced  fatigue.     Additional History:   first pregnancy-taking Metformin for suspected PCOS  Irregular periods; NC 15/16-; Periods were very irregular; one IUI with letrozole  Ovulated        Have you travelled during the pregnancy?No  Have your sexual partner(s) travelled during the pregnancy?No     HISTORY:   Planned Pregnancy: Yes  Marital Status:   Occupation: RN   Living in Household: Spouse-Clay     Past History:  Her past medical history   Past Medical History        Past Medical History:   Diagnosis Date    No known health problems        .       She has a history of  first delivery     Since her last LMP she denies use of alcohol, tobacco and street drugs.     Past medical, surgical, social and family history were reviewed and updated in EPIC.        Current Facility-Administered Medications          Current Outpatient Medications   Medication Sig Dispense Refill    metFORMIN (GLUCOPHAGE XR) 500 MG 24 hr tablet Take 1 tablet (500 mg) by mouth 2 times daily (with meals). 180 tablet 3    Prenat w/o A-FeCbGl-DSS-FA-DHA (PNV OB+DHA PO) Take by mouth.          No current facility-administered medications for this visit.           PHQ-9 score:         2025     3:33 PM   PHQ   PHQ-9 Total Score 0    Q9: Thoughts of better off dead/self-harm past 2 weeks Not at all       Patient-reported                    2023     1:12 PM 2025     3:33 PM   JANET-7 SCORE   Total Score   0 (minimal anxiety)   Total Score 0 0        Patient-reported       OB HISTORY  OB History    Para Term  AB Living   1 0 0 0 0 0   SAB IAB Ectopic Multiple Live Births   0 0 0 0 0      # Outcome Date GA Lbr Vinnie/2nd Weight Sex  "Type Anes PTL Lv   1 Current                PERSONAL HISTORY  Exercise Habits:  walking and weights 3-4 days per week.  Employment: Full time.  Her job involves moderate activity with no potential for toxic exposure.    Travel plans:  are unsure.   Diet: eats regular meals  Abuse concerns? No       Patient supplied answers from flow sheet for:  Prenatal OB Questionnaire.  Past Medical History  Have you ever recieved care for your mental health? : (Patient-Rptd) No  Have you ever been in a major accident or suffered serious trauma?: (Patient-Rptd) No  Within the last year, has anyone hit, slapped, kicked or otherwise hurt you?: (Patient-Rptd) No  In the last year, has anyone forced you to have sex when you didn't want to?: (Patient-Rptd) No     Past Medical History 2   Have you ever received a blood transfusion?: (Patient-Rptd) No  Would you accept a blood transfusion if was medically recommended?: (Patient-Rptd) Yes  Does anyone in your home smoke?: (Patient-Rptd) No   Is your blood type Rh negative?: (Patient-Rptd) Unknown  Have you ever ?: (Patient-Rptd) No  Have you been hospitalized for a nonsurgical reason excluding normal delivery?: (Patient-Rptd) No  Have you ever had an abnormal pap smear?: (Patient-Rptd) No     Past Medical History (Continued)  Do you have a history of abnormalities of the uterus?: (Patient-Rptd) No  Did your mother take CHADD or any other hormones when she was pregnant with you?: (Patient-Rptd) Unknown  Do you have any other problems we have not asked about which you feel may be important to this pregnancy?: (Patient-Rptd) No           ROS:   12 point review of systems negative other than symptoms noted below or in the HPI.  Constitutional: Fatigue       OBJECTIVE:     EXAM:  /68   Ht 1.689 m (5' 6.5\")   Wt 72.3 kg (159 lb 6.4 oz)   LMP 04/06/2025   BMI 25.34 kg/m   Body mass index is 25.34 kg/m .    GENERAL: alert and no distress  NECK: no adenopathy, no asymmetry, " masses, or scars  RESP: lungs clear to auscultation - no rales, rhonchi or wheezes  CV: regular rate and rhythm, normal S1 S2, no S3 or S4, no murmur, click or rub, no peripheral edema  ABDOMEN: soft, nontender, no hepatosplenomegaly, no masses and bowel sounds normal  MS: no gross musculoskeletal defects noted, no edema  SKIN: no suspicious lesions or rashes  NEURO: Normal strength and tone, mentation intact and speech normal  PSYCH: mentation appears normal, affect normal/bright    ASSESSMENT/PLAN:       ICD-10-CM    1. Encounter for supervision of normal first pregnancy in first trimester  Z34.01 Urine Culture Aerobic Bacterial     *UA reflex to Microscopic      2. Irregular periods/menstrual cycles  N92.6       3. BMI 24.0-24.9, adult  Z68.24       4. 7 weeks gestation of pregnancy  Z3A.01           30 year old , On Dolly 3, 2025 patient is 8w2d weeks of pregnancy with MORGAN of 2026, by Last Menstrual Period    Discussed as follows:***  - Dating: *** confirmed by early ultrasound. EDC is ***. Preliminary ultrasound results reviewed in clinic today.  - Genetic screening: NIPS with carrier screening desired by pt, consent and requisition forms signed; ordered.  - Mood is stable. ***  - ASA: *** indicated due to ***. Pt agreeable to start LDA at 12 weeks GA.  - MFM  referral is *** indicated.  - Nausea: interventions reviewed. Recommend unisom/b6 regimen for management. ***  - Pap collected, due for screening. ***  - Vaccines: ***accepting of *** vaccination today     consult for US for AMA patients: {YES/NO/NA IS DEFAULT:417149}  Genetic Testing reviewed and discussed, patient desires ***. Handout provided, consent reviewed and signed.   **81mg aspirin started if 1 or more high risk factors or 2 or more moderate risk factors for preeclampsia present  High risk factors: history of preeclampsia, multifetal gestation, chronic hypertension, type 1 or 2 DM, autoimmune disease (lupus, antiphospholipid  syndrome),  (consider)  Moderate risk factors: nulliparity, BMI >30, family history of preeclampsia (mother or sister), SES factor (AA or low SES), age 35 or older, personal history of prior LBW/SGA baby, history of previous adverse pregnancy outcome, >10 year pregnancy interval)  **any history of antibiotic resistant infection?  *** (FOR RETURN LABS)    COUNSELING  Instructed on use of triage nurse line and contacting the on call CNM after hours in an emergency.   Symptoms of N&V and fatigue usually start to resolve around 12-16 weeks   Reviewed CNM philosophy, call schedule for labor and delivery, and [facility] for delivery  1st OB handout given outlining appointment spacing and CNM information  Reviewed exercise and nutrition  Recommend to gain {WEIGHT GAIN LBS:141017} pounds with her pregnancy.  Discussed OTC medications. OB med list given  Encouraged patient to take PNV's/DHA  Travel precautions discussed, no air travel after 36 weeks  81mg aspirin 1 x day at bedtime to be started at 12-16 weeks, if criteria met  Will notify patient with lab results when available      F/U to be addressed next visit:  ***, Rx's given, referrals    Problems: *** (need for growth,  screening, follow up labs)  TWG:    Will return to the clinic in {:6} weeks for her next routine prenatal check.  Will call to be seen sooner if problems arise.    { E&M time (Optional):644309}    ***     **If patient fits high risk profile and has not had a Hemoglobinopathy draw lab 3525  (High risk groups include: , Southeast Asians, , Mediterranean, Belgian, South and Central American and [place] descent. This is a one-time test.)    avs for fish safety  avs for first ob  avs for nause remedies  Give OB med list and genetic screening handouts  Problem list updated***    Counseling given:   - Follow up in 4-6 weeks for return OB visit.  - Recommended weight gain for pregnancy: {WGT GAIN:912189}   {BMI <  18.5  28-40 lbs   18.5 - 24.9 25-35   25 - 29.9 15-25   > 30  11-20}      PLAN/PATIENT INSTRUCTIONS:    ***     ANA IsidroM

## 2025-06-05 LAB — BACTERIA UR CULT: NORMAL

## 2025-06-24 ENCOUNTER — MEDICAL CORRESPONDENCE (OUTPATIENT)
Dept: HEALTH INFORMATION MANAGEMENT | Facility: CLINIC | Age: 30
End: 2025-06-24
Payer: COMMERCIAL

## 2025-06-25 ENCOUNTER — LAB (OUTPATIENT)
Dept: LAB | Facility: CLINIC | Age: 30
End: 2025-06-25
Payer: COMMERCIAL

## 2025-06-25 DIAGNOSIS — Z34.01 ENCOUNTER FOR SUPERVISION OF NORMAL FIRST PREGNANCY IN FIRST TRIMESTER: ICD-10-CM

## 2025-06-25 DIAGNOSIS — Z13.79 GENETIC SCREENING: ICD-10-CM

## 2025-06-25 LAB
ABO + RH BLD: NORMAL
BLD GP AB SCN SERPL QL: NEGATIVE
ERYTHROCYTE [DISTWIDTH] IN BLOOD BY AUTOMATED COUNT: 11.4 % (ref 10–15)
EST. AVERAGE GLUCOSE BLD GHB EST-MCNC: 94 MG/DL
HBA1C MFR BLD: 4.9 % (ref 0–5.6)
HCT VFR BLD AUTO: 34.9 % (ref 35–47)
HGB BLD-MCNC: 11.7 G/DL (ref 11.7–15.7)
MCH RBC QN AUTO: 30.3 PG (ref 26.5–33)
MCHC RBC AUTO-ENTMCNC: 33.5 G/DL (ref 31.5–36.5)
MCV RBC AUTO: 90 FL (ref 78–100)
PLATELET # BLD AUTO: 233 10E3/UL (ref 150–450)
RBC # BLD AUTO: 3.86 10E6/UL (ref 3.8–5.2)
RUBV IGG SERPL QL IA: 1.26 INDEX
RUBV IGG SERPL QL IA: POSITIVE
SPECIMEN EXP DATE BLD: NORMAL
T PALLIDUM AB SER QL: NONREACTIVE
VZV IGG SER QL IA: 0.46 S/CO
VZV IGG SER QL IA: NEGATIVE
WBC # BLD AUTO: 6.8 10E3/UL (ref 4–11)

## 2025-06-25 PROCEDURE — 87340 HEPATITIS B SURFACE AG IA: CPT

## 2025-06-25 PROCEDURE — 86900 BLOOD TYPING SEROLOGIC ABO: CPT

## 2025-06-25 PROCEDURE — 86787 VARICELLA-ZOSTER ANTIBODY: CPT

## 2025-06-25 PROCEDURE — 86762 RUBELLA ANTIBODY: CPT

## 2025-06-25 PROCEDURE — 86803 HEPATITIS C AB TEST: CPT

## 2025-06-25 PROCEDURE — 36415 COLL VENOUS BLD VENIPUNCTURE: CPT

## 2025-06-25 PROCEDURE — 86901 BLOOD TYPING SEROLOGIC RH(D): CPT

## 2025-06-25 PROCEDURE — 85027 COMPLETE CBC AUTOMATED: CPT

## 2025-06-25 PROCEDURE — 86780 TREPONEMA PALLIDUM: CPT

## 2025-06-25 PROCEDURE — 83036 HEMOGLOBIN GLYCOSYLATED A1C: CPT

## 2025-06-25 PROCEDURE — 86850 RBC ANTIBODY SCREEN: CPT

## 2025-06-25 PROCEDURE — 87389 HIV-1 AG W/HIV-1&-2 AB AG IA: CPT

## 2025-06-26 ENCOUNTER — RESULTS FOLLOW-UP (OUTPATIENT)
Dept: OBGYN | Facility: CLINIC | Age: 30
End: 2025-06-26

## 2025-06-26 PROBLEM — O09.899 MATERNAL VARICELLA, NON-IMMUNE: Status: ACTIVE | Noted: 2025-06-26

## 2025-06-26 PROBLEM — Z28.39 MATERNAL VARICELLA, NON-IMMUNE: Status: ACTIVE | Noted: 2025-06-26

## 2025-06-26 LAB
HBV SURFACE AG SERPL QL IA: NONREACTIVE
HCV AB SERPL QL IA: NONREACTIVE
HIV 1+2 AB+HIV1 P24 AG SERPL QL IA: NONREACTIVE

## 2025-07-02 LAB — SCANNED LAB RESULT: NORMAL

## 2025-07-11 NOTE — PROGRESS NOTES
Progress Notes  Shruthi Syed, RN (Registered Nurse)  Expand All Collapse All     SUBJECTIVE:      HPI:     This is a 30 year old female patient,  who presents for her first obstetrical visit.     MORGAN: 2026, by Last Menstrual Period.  She is 6w4d weeks.  Her cycles are irregular.  Her last menstrual period was normal.   Since her LMP, she has experienced  fatigue.     Additional History: first pregnancy-taking Melatonin for suspected PCOS     Have you travelled during the pregnancy?No  Have your sexual partner(s) travelled during the pregnancy?No     HISTORY:   Planned Pregnancy: Yes  Marital Status:   Occupation: RN   Living in Household: Spouse-Clay     Past History:  Her past medical history   Past Medical History        Past Medical History:   Diagnosis Date    No known health problems        .       She has a history of  first delivery     Since her last LMP she denies use of alcohol, tobacco and street drugs.     Past medical, surgical, social and family history were reviewed and updated in EPIC.        Current Facility-Administered Medications          Current Outpatient Medications   Medication Sig Dispense Refill    metFORMIN (GLUCOPHAGE XR) 500 MG 24 hr tablet Take 1 tablet (500 mg) by mouth 2 times daily (with meals). 180 tablet 3    Prenat w/o A-FeCbGl-DSS-FA-DHA (PNV OB+DHA PO) Take by mouth.          No current facility-administered medications for this visit.            ROS:   12 point review of systems negative other than symptoms noted below or in the HPI.  Constitutional: Fatigue     Confirmed patient desires delivery at [facility] with the [facility] provider.     Nurse phone visit completed. Prenatal book and folder (containing standard educational hand-outs and brochures) will be given at the next visit to patient. Information in folder reviewed over the phone. Questions answered. Information given on optional screening available to assess  chromosomal anomalies. Pt desires NIPS. Pt advised to call the clinic if she has any questions or concerns related to her pregnancy. Prenatal labs future ordered.   Shruthi Syed RN on 5/22/2025 at 1:09 PM              Lab Results   Component Value Date     PAP NIL 05/20/2021         PHQ-9 score:         5/21/2025     3:33 PM   PHQ   PHQ-9 Total Score 0    Q9: Thoughts of better off dead/self-harm past 2 weeks Not at all       Patient-reported                          4/6/2023     1:12 PM 5/21/2025     3:33 PM   JANET-7 SCORE   Total Score   0 (minimal anxiety)   Total Score 0 0        Patient-reported               Patient supplied answers from flow sheet for:  Prenatal OB Questionnaire.  Past Medical History  Have you ever recieved care for your mental health? : (Patient-Rptd) No  Have you ever been in a major accident or suffered serious trauma?: (Patient-Rptd) No  Within the last year, has anyone hit, slapped, kicked or otherwise hurt you?: (Patient-Rptd) No  In the last year, has anyone forced you to have sex when you didn't want to?: (Patient-Rptd) No     Past Medical History 2   Have you ever received a blood transfusion?: (Patient-Rptd) No  Would you accept a blood transfusion if was medically recommended?: (Patient-Rptd) Yes  Does anyone in your home smoke?: (Patient-Rptd) No   Is your blood type Rh negative?: (Patient-Rptd) Unknown  Have you ever ?: (Patient-Rptd) No  Have you been hospitalized for a nonsurgical reason excluding normal delivery?: (Patient-Rptd) No  Have you ever had an abnormal pap smear?: (Patient-Rptd) No     Past Medical History (Continued)  Do you have a history of abnormalities of the uterus?: (Patient-Rptd) No  Did your mother take CHADD or any other hormones when she was pregnant with you?: (Patient-Rptd) Unknown  Do you have any other problems we have not asked about which you feel may be important to this pregnancy?: (Patient-Rptd) No                   OBJECTIVE:      EXAM:  LMP 2025  There is no height or weight on file to calculate BMI.    {female exam complete:866178}    ASSESSMENT/PLAN:       ICD-10-CM    1. Encounter for supervision of normal first pregnancy in first trimester  Z34.01           30 year old , On 2025 patient is 13w2d weeks of pregnancy with MORGAN of 2026, by Est. Date of Conception    Discussed as follows:***    Counseling given:   - Follow up in 4-6 weeks for return OB visit.  - Recommended weight gain for pregnancy: {WGT GAIN:585258}   {BMI < 18.5  28-40 lbs   18.5 - 24.9 25-35   25 - 29.9 15-25   > 30  11-20}      PLAN/PATIENT INSTRUCTIONS:    ***     ANA Melendez CNM

## 2025-07-14 ENCOUNTER — PRENATAL OFFICE VISIT (OUTPATIENT)
Dept: MIDWIFE SERVICES | Facility: CLINIC | Age: 30
End: 2025-07-14
Payer: COMMERCIAL

## 2025-07-14 VITALS
DIASTOLIC BLOOD PRESSURE: 52 MMHG | SYSTOLIC BLOOD PRESSURE: 100 MMHG | HEIGHT: 67 IN | BODY MASS INDEX: 25.71 KG/M2 | WEIGHT: 163.8 LBS

## 2025-07-14 DIAGNOSIS — Z34.02 ENCOUNTER FOR SUPERVISION OF NORMAL FIRST PREGNANCY IN SECOND TRIMESTER: Primary | ICD-10-CM

## 2025-07-14 DIAGNOSIS — Z11.3 SCREEN FOR STD (SEXUALLY TRANSMITTED DISEASE): ICD-10-CM

## 2025-07-14 PROCEDURE — 0502F SUBSEQUENT PRENATAL CARE: CPT | Performed by: ADVANCED PRACTICE MIDWIFE

## 2025-07-14 PROCEDURE — 3078F DIAST BP <80 MM HG: CPT | Performed by: ADVANCED PRACTICE MIDWIFE

## 2025-07-14 PROCEDURE — 87491 CHLMYD TRACH DNA AMP PROBE: CPT | Performed by: ADVANCED PRACTICE MIDWIFE

## 2025-07-14 PROCEDURE — 99207 PR PRENATAL VISIT: CPT | Performed by: ADVANCED PRACTICE MIDWIFE

## 2025-07-14 PROCEDURE — 87591 N.GONORRHOEAE DNA AMP PROB: CPT | Performed by: ADVANCED PRACTICE MIDWIFE

## 2025-07-14 PROCEDURE — 3074F SYST BP LT 130 MM HG: CPT | Performed by: ADVANCED PRACTICE MIDWIFE

## 2025-07-14 NOTE — PATIENT INSTRUCTIONS
"\"I hope you had a positive experience and that you can definitely recommend [facility] to your family and friends. You ll be receiving a survey soon and I would look forward to hearing your feedback\".  Weeks 14 to 18 of Your Pregnancy: Care Instructions  Around this time, you may start to look pregnant. Your baby is now able to pass urine. And the first stool (meconium) is starting to collect in your baby's intestines. Hair is starting to grow on your baby's head.    You may notice some skin changes, such as itchy spots on your palms or acne on your face.   At your next doctor visit, you may have an ultrasound. So you might think about whether you want to know the sex of your baby. Also ask your doctor about flu and COVID-19 shots.   Tips for weeks 14 to 18 of pregnancy  How to reduce stress       Ask for help when you need it.  Try to avoid things that cause you stress.  Seek out things that relieve stress, such as breathing exercises or yoga.  How to get exercise       If you don't usually exercise, start slowly. Short walks may be a good choice.  Try to be active 30 minutes a day, at least 5 days a week.  Avoid activities where you're more likely to fall.  Use light weights to reduce stress on your joints.  How to stay at a healthy weight for you       Talk to your doctor or midwife about how much weight you should gain.  It's generally best to gain:  About 28 to 40 pounds if you're underweight.  About 25 to 35 pounds if you're at a healthy weight.  About 15 to 25 pounds if you're overweight.  About 11 to 20 pounds if you're very overweight (obese).  Follow-up care is a key part of your treatment and safety. Be sure to make and go to all appointments, and call your doctor if you are having problems. It's also a good idea to know your test results and keep a list of the medicines you take.  When should you call for help?  Call 911  anytime you think you may need emergency care. For example, call " "if:  You have severe vaginal bleeding. You have soaked through one or more pads in an hour, and the bleeding is not slowing down.  You have chest pain, are short of breath, or cough up blood.  You have sudden, severe pain in your belly that does not go away.  You passed out (lost consciousness).  Call your doctor or midwife now or seek immediate medical care if:  You have a fever.  You have vaginal bleeding.  You are dizzy or lightheaded, or you feel like you may faint.  You have signs of a blood clot in your leg (called a deep vein thrombosis), such as:  Pain in the calf, back of the knee, thigh, or groin.  Swelling in your leg or groin.  A color change on the leg or groin. The skin may be reddish or purplish.  You have symptoms of a urinary tract infection. These may include:  Pain or burning when you urinate.  A frequent need to urinate without being able to pass much urine.  Pain in your low back (below the rib cage and above the waist).  Blood in your urine.  You have belly pain.  You think you are having contractions.  Watch closely for changes in your health, and be sure to contact your doctor or midwife if:  You have vaginal discharge that smells bad.  You feel sad, anxious, or hopeless for more than a few days.  You have other concerns about your pregnancy.  Where can you learn more?  Go to https://www.VerticalResponse.InstantQ/patiented  Enter I453 in the search box to learn more about \"Weeks 14 to 18 of Your Pregnancy: Care Instructions.\"  Current as of: April 30, 2024  Content Version: 14.5    4016-9508 [email or web address].   Care instructions adapted under license by your healthcare professional. If you have questions about a medical condition or this instruction, always ask your healthcare professional. [employer] disclaims any warranty or liability for your use of this information.    Second-Trimester Fetal Ultrasound: About This Test  What is it?     Fetal ultrasound is a test that uses sound " waves to make pictures of your baby (fetus) and placenta inside the uterus. The test is the safest way to find out the age, size, and position of your baby. You also may be able to find out the sex of your baby. (But the test isn't done just to find out a baby's sex.)  No known risks to the mother or the baby are linked to fetal ultrasound. But you may feel anxious if the test reveals a problem with your pregnancy or baby.  Why is this test done?  In the second trimester, a fetal ultrasound is done to:  Estimate the number of weeks and days a fetus has developed since the beginning of the pregnancy. This is called the gestational age.  Look at the size and position of the fetus, the placenta, and the fluid that surrounds the fetus.  Find major birth defects, such as heart problems or problems with the brain and spinal cord (neural tube defects). But the test may not be able to find many minor defects and some major birth defects.  How do you prepare for the test?  In general, there's nothing you have to do before this test, unless your doctor tells you to.  How is the test done?  You may be able to leave your clothes on, or you will be given a gown to wear.  You will lie on your back on a padded examination table.  A gel will be spread on your belly. It will be removed after the test.  A small, handheld device called a transducer will be pressed against the gel on your skin and moved across your belly several times.  You may watch the monitor to see the picture of your baby during the test.  What happens after the test?  You will probably be able to go home right away.  You most likely will be able to go back to your usual activities right away.  Follow-up care is a key part of your treatment and safety. Be sure to make and go to all appointments, and call your doctor if you are having problems. It's also a good idea to keep a list of the medicines you take. Ask your doctor when you can expect to have your test  "results.  Where can you learn more?  Go to https://www.[employer]m.net/patiented  Enter Y671 in the search box to learn more about \"Second-Trimester Fetal Ultrasound: About This Test.\"  Current as of: April 30, 2024  Content Version: 14.5 2024-2025 [employer].   Care instructions adapted under license by your healthcare professional. If you have questions about a medical condition or this instruction, always ask your healthcare professional. [employer] disclaims any warranty or liability for your use of this information.    During Pregnancy: Exercises  Introduction  Here are some examples of exercises to do during your pregnancy. Start each exercise slowly. Ease off the exercise if you start to have pain.  Talk to your doctor about when you can start these exercises and which ones will work best for you.  How to do the exercises  Neck rotation    Sit up straight in a firm chair, or stand up straight. If you're standing, keep your feet about hip-width apart.  Keeping your chin level, turn your head to the right and hold for 15 to 30 seconds.  Turn your head to the left and hold for 15 to 30 seconds.  Repeat 2 to 4 times.  Neck stretch to the front    Sit up straight in a firm chair, or stand up straight. Look straight ahead. If you're standing, keep your feet about hip-width apart.  Slowly bend your head forward without moving your shoulders.  Hold for 15 to 30 seconds, then return to your starting position.  Repeat 2 to 4 times.  Back press    Stand with your back 10 to 12 inches away from a wall.  Lean into the wall until your back is against it. Press your lower back against the wall by pulling in your stomach muscles.  Slowly slide down until your knees are slightly bent, pressing your lower back against the wall.  Hold for at least 6 seconds, then slide back up the wall.  Repeat 8 to 12 times.  Over time, work up to holding this position for as much as 1 minute.  Trunk twist    Sit on " the floor with your legs crossed. If that's not comfortable, you can sit on a folded blanket so your bottom is a few inches off the floor. Or you can sit on a chair with your knees hip-width apart and your feet flat on the floor.  Reach your left hand toward your right knee. You can place your right hand at your side for support.  Slowly twist your body (trunk) to your right.  Relax and return to your starting position.  Repeat 2 to 4 times.  Switch your hands and twist to your left.  Repeat 2 to 4 times.  Pelvic rocking on hands and knees    Start on your hands and knees. Place your wrists directly below your shoulders and your knees below your hips.  Breathe in slowly. Tuck your head downward and round your back up, making a curve with your back in the shape of the letter C. Hold this position for about 6 seconds.  Breathe out slowly and bring your head back up. Relax, keeping your back straight. (Don't allow it to curve toward the floor.) Hold for about 6 seconds.  Repeat 8 to 12 times, gently rocking your pelvis.  Pelvic tilt    Lie on your back with your knees bent and your feet flat on the floor.  Tighten your belly muscles by pulling your belly button in toward your spine. Press your lower back to the floor. You should feel your hips and pelvis rock back.  Hold for 6 seconds while breathing smoothly, and then relax.  Repeat 8 to 12 times.  Do this exercise only during the first 4 months of pregnancy. After this point, lying on your back is not recommended, because it can cause blood flow problems for you and your baby.  Backward stretch    Start on your hands and knees with your knees 8 to 10 inches apart, hands directly below your shoulders, and arms and back straight.  Keeping your arms straight, slowly lower your buttocks toward your heels and tuck your head toward your knees. Hold for 15 to 30 seconds.  Slowly return to the starting position.  Repeat 2 to 4 times.  Forward bend    Sit comfortably in a  chair, with your arms relaxed.  Slowly bend forward, allowing your arms to hang down. Lean only as far as you can without feeling discomfort or pressure on your belly.  Hold for 15 to 30 seconds and then slowly sit up straight.  Repeat 2 to 4 times or to your comfort level.  Donkey kick    Start on your hands and knees. Place your hands directly below your shoulders, and keep your arms straight.  Tighten your belly muscles by pulling your belly button in toward your spine. Keep breathing normally, and don't hold your breath.  Lift one knee and bring it toward your elbow.  Slowly extend that leg behind you without completely straightening it. Be careful not to let your hip drop down. Avoid arching your back.  Hold your leg behind you for about 6 seconds.  Return to your starting position.  Repeat 8 to 12 times for each leg.  Tailor sitting    Sit on the floor.  Bring your feet close to your body while crossing your ankles.  Keep your back straight. Relax your legs and let your knees drop toward the floor.  Hold this position for as long as you are comfortable.  Toe reach    Sit on the floor with your back straight, legs about 12 inches apart, and feet relaxed outward.  Stretch your hands forward toward your right foot, then sit up.  Stretch your hands straight forward, then sit up.  Stretch your hands forward toward your left foot, then sit up.  Hold each stretch for 15 to 30 seconds.  Repeat 2 to 4 times.  Follow-up care is a key part of your treatment and safety. Be sure to make and go to all appointments, and call your doctor if you are having problems. It's also a good idea to know your test results and keep a list of the medicines you take.  Current as of: April 30, 2024  Content Version: 14.5    0909-0092 [employer].   Care instructions adapted under license by your healthcare professional. If you have questions about a medical condition or this instruction, always ask your healthcare professional.  [employer], [facility] disclaims any warranty or liability for your use of this information.

## 2025-07-14 NOTE — PROGRESS NOTES
13w5d  Patient feels well overall. Here with Clay today.  Will discontinue taking Metformin this Wednesday  Educated about diet, exercise and normal weight gain  Normal to feel movement between 18-22 weeks  Reviewed labs from 1st OB? Reviewed  GC/Chlamydia urine collection today  Genetic screening discussed: Patient has already completed NIPS     Warning signs discussed    Return to clinic 5 weeks for routine prenatal visit and fetal anatomy scan      Problems:  (need for growth,  screening, follow up labs)  TWlbs    Jaclyn PEREZ CNM

## 2025-07-15 LAB
C TRACH DNA SPEC QL NAA+PROBE: NEGATIVE
N GONORRHOEA DNA SPEC QL NAA+PROBE: NEGATIVE
SPECIMEN TYPE: NORMAL
SPECIMEN TYPE: NORMAL

## 2025-08-21 ENCOUNTER — ANCILLARY PROCEDURE (OUTPATIENT)
Dept: ULTRASOUND IMAGING | Facility: CLINIC | Age: 30
End: 2025-08-21
Attending: ADVANCED PRACTICE MIDWIFE
Payer: COMMERCIAL

## 2025-08-21 ENCOUNTER — PRENATAL OFFICE VISIT (OUTPATIENT)
Dept: MIDWIFE SERVICES | Facility: CLINIC | Age: 30
End: 2025-08-21
Attending: ADVANCED PRACTICE MIDWIFE
Payer: COMMERCIAL

## 2025-08-21 VITALS
SYSTOLIC BLOOD PRESSURE: 98 MMHG | DIASTOLIC BLOOD PRESSURE: 56 MMHG | WEIGHT: 170.6 LBS | HEIGHT: 67 IN | BODY MASS INDEX: 26.78 KG/M2

## 2025-08-21 DIAGNOSIS — O09.899 MATERNAL VARICELLA, NON-IMMUNE: ICD-10-CM

## 2025-08-21 DIAGNOSIS — Z34.01 ENCOUNTER FOR SUPERVISION OF NORMAL FIRST PREGNANCY IN FIRST TRIMESTER: Primary | ICD-10-CM

## 2025-08-21 DIAGNOSIS — Z28.39 MATERNAL VARICELLA, NON-IMMUNE: ICD-10-CM

## 2025-08-21 DIAGNOSIS — Z36.2 ENCOUNTER FOR FOLLOW-UP ULTRASOUND OF FETAL ANATOMY: ICD-10-CM

## 2025-08-21 DIAGNOSIS — Z34.02 ENCOUNTER FOR SUPERVISION OF NORMAL FIRST PREGNANCY IN SECOND TRIMESTER: ICD-10-CM

## 2025-08-21 PROCEDURE — 76805 OB US >/= 14 WKS SNGL FETUS: CPT | Performed by: STUDENT IN AN ORGANIZED HEALTH CARE EDUCATION/TRAINING PROGRAM

## 2025-08-30 ENCOUNTER — HEALTH MAINTENANCE LETTER (OUTPATIENT)
Age: 30
End: 2025-08-30